# Patient Record
Sex: MALE | Race: WHITE
[De-identification: names, ages, dates, MRNs, and addresses within clinical notes are randomized per-mention and may not be internally consistent; named-entity substitution may affect disease eponyms.]

---

## 2017-01-04 ENCOUNTER — HOSPITAL ENCOUNTER (OUTPATIENT)
Dept: HOSPITAL 62 - OD | Age: 67
End: 2017-01-04
Attending: INTERNAL MEDICINE
Payer: MEDICARE

## 2017-01-04 DIAGNOSIS — R80.9: ICD-10-CM

## 2017-01-04 DIAGNOSIS — E11.22: Primary | ICD-10-CM

## 2017-01-04 DIAGNOSIS — I12.9: ICD-10-CM

## 2017-01-04 DIAGNOSIS — N18.4: ICD-10-CM

## 2017-01-04 DIAGNOSIS — D63.1: ICD-10-CM

## 2017-01-04 LAB
ANION GAP SERPL CALC-SCNC: 13 MMOL/L (ref 5–19)
APPEARANCE UR: (no result)
BILIRUB UR QL STRIP: NEGATIVE
BUN SERPL-MCNC: 52 MG/DL (ref 7–20)
CALCIUM: 8.7 MG/DL (ref 8.4–10.2)
CHLORIDE SERPL-SCNC: 103 MMOL/L (ref 98–107)
CO2 SERPL-SCNC: 24 MMOL/L (ref 22–30)
CREAT SERPL-MCNC: 3.51 MG/DL (ref 0.52–1.25)
ERYTHROCYTE [DISTWIDTH] IN BLOOD BY AUTOMATED COUNT: 14.8 % (ref 11.5–14)
GLUCOSE SERPL-MCNC: 365 MG/DL (ref 75–110)
GLUCOSE UR STRIP-MCNC: 150 MG/DL
HCT VFR BLD CALC: 39 % (ref 37.9–51)
HGB BLD-MCNC: 13 G/DL (ref 13.5–17)
HGB HCT DIFFERENCE: 0
KETONES UR STRIP-MCNC: NEGATIVE MG/DL
MCH RBC QN AUTO: 30 PG (ref 27–33.4)
MCHC RBC AUTO-ENTMCNC: 33.3 G/DL (ref 32–36)
MCV RBC AUTO: 90 FL (ref 80–97)
NITRITE UR QL STRIP: NEGATIVE
PH UR STRIP: 5 [PH] (ref 5–9)
PHOSPHATE SERPL-MCNC: 4.6 MG/DL (ref 2.5–4.5)
POTASSIUM SERPL-SCNC: 4.5 MMOL/L (ref 3.6–5)
PROT UR STRIP-MCNC: >=500 MG/DL
RBC # BLD AUTO: 4.34 10^6/UL (ref 4.35–5.55)
SODIUM SERPL-SCNC: 140 MMOL/L (ref 137–145)
SP GR UR STRIP: 1.02
UROBILINOGEN UR-MCNC: NEGATIVE MG/DL (ref ?–2)
WBC # BLD AUTO: 7.8 10^3/UL (ref 4–10.5)

## 2017-01-04 PROCEDURE — 83970 ASSAY OF PARATHORMONE: CPT

## 2017-01-04 PROCEDURE — 85027 COMPLETE CBC AUTOMATED: CPT

## 2017-01-04 PROCEDURE — 84100 ASSAY OF PHOSPHORUS: CPT

## 2017-01-04 PROCEDURE — 81001 URINALYSIS AUTO W/SCOPE: CPT

## 2017-01-04 PROCEDURE — 36415 COLL VENOUS BLD VENIPUNCTURE: CPT

## 2017-01-04 PROCEDURE — 80048 BASIC METABOLIC PNL TOTAL CA: CPT

## 2017-04-07 ENCOUNTER — HOSPITAL ENCOUNTER (OUTPATIENT)
Dept: HOSPITAL 62 - OD | Age: 67
End: 2017-04-07
Attending: INTERNAL MEDICINE
Payer: MEDICARE

## 2017-04-07 DIAGNOSIS — N18.9: Primary | ICD-10-CM

## 2017-04-07 DIAGNOSIS — R80.9: ICD-10-CM

## 2017-04-07 DIAGNOSIS — E11.9: ICD-10-CM

## 2017-04-07 DIAGNOSIS — E87.5: ICD-10-CM

## 2017-04-07 DIAGNOSIS — D64.9: ICD-10-CM

## 2017-04-07 LAB
ANION GAP SERPL CALC-SCNC: 11 MMOL/L (ref 5–19)
BUN SERPL-MCNC: 59 MG/DL (ref 7–20)
CALCIUM: 9 MG/DL (ref 8.4–10.2)
CHLORIDE SERPL-SCNC: 109 MMOL/L (ref 98–107)
CO2 SERPL-SCNC: 25 MMOL/L (ref 22–30)
CREAT SERPL-MCNC: 3.89 MG/DL (ref 0.52–1.25)
ERYTHROCYTE [DISTWIDTH] IN BLOOD BY AUTOMATED COUNT: 15.4 % (ref 11.5–14)
GLUCOSE SERPL-MCNC: 118 MG/DL (ref 75–110)
HCT VFR BLD CALC: 38.5 % (ref 37.9–51)
HGB BLD-MCNC: 12.9 G/DL (ref 13.5–17)
HGB HCT DIFFERENCE: 0.2
MCH RBC QN AUTO: 29.7 PG (ref 27–33.4)
MCHC RBC AUTO-ENTMCNC: 33.7 G/DL (ref 32–36)
MCV RBC AUTO: 88 FL (ref 80–97)
PHOSPHATE SERPL-MCNC: 4.7 MG/DL (ref 2.5–4.5)
POTASSIUM SERPL-SCNC: 3.9 MMOL/L (ref 3.6–5)
RBC # BLD AUTO: 4.36 10^6/UL (ref 4.35–5.55)
SODIUM SERPL-SCNC: 145.1 MMOL/L (ref 137–145)
WBC # BLD AUTO: 7.4 10^3/UL (ref 4–10.5)

## 2017-04-07 PROCEDURE — 83970 ASSAY OF PARATHORMONE: CPT

## 2017-04-07 PROCEDURE — 85027 COMPLETE CBC AUTOMATED: CPT

## 2017-04-07 PROCEDURE — 36415 COLL VENOUS BLD VENIPUNCTURE: CPT

## 2017-04-07 PROCEDURE — 80048 BASIC METABOLIC PNL TOTAL CA: CPT

## 2017-04-07 PROCEDURE — 84100 ASSAY OF PHOSPHORUS: CPT

## 2017-08-02 ENCOUNTER — HOSPITAL ENCOUNTER (OUTPATIENT)
Dept: HOSPITAL 62 - OD | Age: 67
End: 2017-08-02
Attending: INTERNAL MEDICINE
Payer: MEDICARE

## 2017-08-02 DIAGNOSIS — N18.4: Primary | ICD-10-CM

## 2017-08-02 DIAGNOSIS — E87.5: ICD-10-CM

## 2017-08-02 DIAGNOSIS — E11.9: ICD-10-CM

## 2017-08-02 DIAGNOSIS — D64.9: ICD-10-CM

## 2017-08-02 LAB
ANION GAP SERPL CALC-SCNC: 11 MMOL/L (ref 5–19)
APPEARANCE UR: (no result)
BILIRUB UR QL STRIP: NEGATIVE
BUN SERPL-MCNC: 57 MG/DL (ref 7–20)
CALCIUM: 8.9 MG/DL (ref 8.4–10.2)
CHLORIDE SERPL-SCNC: 109 MMOL/L (ref 98–107)
CO2 SERPL-SCNC: 24 MMOL/L (ref 22–30)
CREAT SERPL-MCNC: 3.77 MG/DL (ref 0.52–1.25)
ERYTHROCYTE [DISTWIDTH] IN BLOOD BY AUTOMATED COUNT: 16.7 % (ref 11.5–14)
GLUCOSE SERPL-MCNC: 138 MG/DL (ref 75–110)
GLUCOSE UR STRIP-MCNC: 50 MG/DL
HCT VFR BLD CALC: 36.3 % (ref 37.9–51)
HGB BLD-MCNC: 12.1 G/DL (ref 13.5–17)
HGB HCT DIFFERENCE: 0
KETONES UR STRIP-MCNC: NEGATIVE MG/DL
MAGNESIUM SERPL-MCNC: 1.9 MG/DL (ref 1.6–2.3)
MCH RBC QN AUTO: 30.5 PG (ref 27–33.4)
MCHC RBC AUTO-ENTMCNC: 33.3 G/DL (ref 32–36)
MCV RBC AUTO: 92 FL (ref 80–97)
NITRITE UR QL STRIP: NEGATIVE
PH UR STRIP: 5 [PH] (ref 5–9)
PHOSPHATE SERPL-MCNC: 5 MG/DL (ref 2.5–4.5)
POTASSIUM SERPL-SCNC: 4.8 MMOL/L (ref 3.6–5)
PROT UR STRIP-MCNC: >=500 MG/DL
RBC # BLD AUTO: 3.96 10^6/UL (ref 4.35–5.55)
SODIUM SERPL-SCNC: 144 MMOL/L (ref 137–145)
SP GR UR STRIP: 1.02
UROBILINOGEN UR-MCNC: NEGATIVE MG/DL (ref ?–2)
WBC # BLD AUTO: 6.2 10^3/UL (ref 4–10.5)

## 2017-08-02 PROCEDURE — 81001 URINALYSIS AUTO W/SCOPE: CPT

## 2017-08-02 PROCEDURE — 85027 COMPLETE CBC AUTOMATED: CPT

## 2017-08-02 PROCEDURE — 83970 ASSAY OF PARATHORMONE: CPT

## 2017-08-02 PROCEDURE — 83735 ASSAY OF MAGNESIUM: CPT

## 2017-08-02 PROCEDURE — 84100 ASSAY OF PHOSPHORUS: CPT

## 2017-08-02 PROCEDURE — 36415 COLL VENOUS BLD VENIPUNCTURE: CPT

## 2017-08-02 PROCEDURE — 80048 BASIC METABOLIC PNL TOTAL CA: CPT

## 2017-09-05 ENCOUNTER — HOSPITAL ENCOUNTER (OUTPATIENT)
Dept: HOSPITAL 62 - OD | Age: 67
End: 2017-09-05
Attending: INTERNAL MEDICINE
Payer: MEDICARE

## 2017-09-05 DIAGNOSIS — E87.5: ICD-10-CM

## 2017-09-05 DIAGNOSIS — I12.9: ICD-10-CM

## 2017-09-05 DIAGNOSIS — N18.4: ICD-10-CM

## 2017-09-05 DIAGNOSIS — I50.9: ICD-10-CM

## 2017-09-05 DIAGNOSIS — E11.22: Primary | ICD-10-CM

## 2017-09-05 LAB
ANION GAP SERPL CALC-SCNC: 11 MMOL/L (ref 5–19)
BUN SERPL-MCNC: 54 MG/DL (ref 7–20)
CALCIUM: 9.4 MG/DL (ref 8.4–10.2)
CHLORIDE SERPL-SCNC: 107 MMOL/L (ref 98–107)
CO2 SERPL-SCNC: 27 MMOL/L (ref 22–30)
CREAT SERPL-MCNC: 4.21 MG/DL (ref 0.52–1.25)
ERYTHROCYTE [DISTWIDTH] IN BLOOD BY AUTOMATED COUNT: 16.5 % (ref 11.5–14)
GLUCOSE SERPL-MCNC: 73 MG/DL (ref 75–110)
HCT VFR BLD CALC: 31.5 % (ref 37.9–51)
HGB BLD-MCNC: 10.6 G/DL (ref 13.5–17)
HGB HCT DIFFERENCE: 0.3
MCH RBC QN AUTO: 30.8 PG (ref 27–33.4)
MCHC RBC AUTO-ENTMCNC: 33.6 G/DL (ref 32–36)
MCV RBC AUTO: 92 FL (ref 80–97)
POTASSIUM SERPL-SCNC: 4.3 MMOL/L (ref 3.6–5)
RBC # BLD AUTO: 3.44 10^6/UL (ref 4.35–5.55)
SODIUM SERPL-SCNC: 144.6 MMOL/L (ref 137–145)
WBC # BLD AUTO: 7.7 10^3/UL (ref 4–10.5)

## 2017-09-05 PROCEDURE — 85027 COMPLETE CBC AUTOMATED: CPT

## 2017-09-05 PROCEDURE — 80048 BASIC METABOLIC PNL TOTAL CA: CPT

## 2017-09-05 PROCEDURE — 36415 COLL VENOUS BLD VENIPUNCTURE: CPT

## 2017-10-31 ENCOUNTER — HOSPITAL ENCOUNTER (OUTPATIENT)
Dept: HOSPITAL 62 - OD | Age: 67
End: 2017-10-31
Attending: INTERNAL MEDICINE
Payer: MEDICARE

## 2017-10-31 DIAGNOSIS — N18.4: Primary | ICD-10-CM

## 2017-10-31 DIAGNOSIS — I50.9: ICD-10-CM

## 2017-10-31 DIAGNOSIS — R80.9: ICD-10-CM

## 2017-10-31 DIAGNOSIS — D64.9: ICD-10-CM

## 2017-10-31 LAB
ANION GAP SERPL CALC-SCNC: 11 MMOL/L (ref 5–19)
BUN SERPL-MCNC: 56 MG/DL (ref 7–20)
CALCIUM: 8.8 MG/DL (ref 8.4–10.2)
CHLORIDE SERPL-SCNC: 109 MMOL/L (ref 98–107)
CO2 SERPL-SCNC: 25 MMOL/L (ref 22–30)
CREAT SERPL-MCNC: 3.96 MG/DL (ref 0.52–1.25)
ERYTHROCYTE [DISTWIDTH] IN BLOOD BY AUTOMATED COUNT: 15.7 % (ref 11.5–14)
GLUCOSE SERPL-MCNC: 158 MG/DL (ref 75–110)
HCT VFR BLD CALC: 34.4 % (ref 37.9–51)
HGB BLD-MCNC: 11.5 G/DL (ref 13.5–17)
HGB HCT DIFFERENCE: 0.1
MAGNESIUM SERPL-MCNC: 1.6 MG/DL (ref 1.6–2.3)
MCH RBC QN AUTO: 30.9 PG (ref 27–33.4)
MCHC RBC AUTO-ENTMCNC: 33.4 G/DL (ref 32–36)
MCV RBC AUTO: 93 FL (ref 80–97)
POTASSIUM SERPL-SCNC: 4.1 MMOL/L (ref 3.6–5)
RBC # BLD AUTO: 3.72 10^6/UL (ref 4.35–5.55)
SODIUM SERPL-SCNC: 144.5 MMOL/L (ref 137–145)
WBC # BLD AUTO: 7.8 10^3/UL (ref 4–10.5)

## 2017-10-31 PROCEDURE — 83735 ASSAY OF MAGNESIUM: CPT

## 2017-10-31 PROCEDURE — 36415 COLL VENOUS BLD VENIPUNCTURE: CPT

## 2017-10-31 PROCEDURE — 85027 COMPLETE CBC AUTOMATED: CPT

## 2017-10-31 PROCEDURE — 80048 BASIC METABOLIC PNL TOTAL CA: CPT

## 2017-12-27 ENCOUNTER — HOSPITAL ENCOUNTER (OUTPATIENT)
Dept: HOSPITAL 62 - OD | Age: 67
End: 2017-12-27
Attending: INTERNAL MEDICINE
Payer: MEDICARE

## 2017-12-27 DIAGNOSIS — N18.4: Primary | ICD-10-CM

## 2017-12-27 DIAGNOSIS — R80.9: ICD-10-CM

## 2017-12-27 DIAGNOSIS — D64.9: ICD-10-CM

## 2017-12-27 DIAGNOSIS — I50.9: ICD-10-CM

## 2017-12-27 LAB
ANION GAP SERPL CALC-SCNC: 10 MMOL/L (ref 5–19)
BUN SERPL-MCNC: 55 MG/DL (ref 7–20)
CALCIUM: 8.6 MG/DL (ref 8.4–10.2)
CHLORIDE SERPL-SCNC: 109 MMOL/L (ref 98–107)
CO2 SERPL-SCNC: 26 MMOL/L (ref 22–30)
ERYTHROCYTE [DISTWIDTH] IN BLOOD BY AUTOMATED COUNT: 16 % (ref 11.5–14)
GLUCOSE SERPL-MCNC: 184 MG/DL (ref 75–110)
HCT VFR BLD CALC: 34.5 % (ref 37.9–51)
HGB BLD-MCNC: 11.1 G/DL (ref 13.5–17)
MCH RBC QN AUTO: 29.7 PG (ref 27–33.4)
MCHC RBC AUTO-ENTMCNC: 32.1 G/DL (ref 32–36)
MCV RBC AUTO: 93 FL (ref 80–97)
PLATELET # BLD: 141 10^3/UL (ref 150–450)
POTASSIUM SERPL-SCNC: 4 MMOL/L (ref 3.6–5)
RBC # BLD AUTO: 3.73 10^6/UL (ref 4.35–5.55)
SODIUM SERPL-SCNC: 145.2 MMOL/L (ref 137–145)
WBC # BLD AUTO: 8.1 10^3/UL (ref 4–10.5)

## 2017-12-27 PROCEDURE — 36415 COLL VENOUS BLD VENIPUNCTURE: CPT

## 2017-12-27 PROCEDURE — 80048 BASIC METABOLIC PNL TOTAL CA: CPT

## 2017-12-27 PROCEDURE — 85027 COMPLETE CBC AUTOMATED: CPT

## 2018-01-19 ENCOUNTER — HOSPITAL ENCOUNTER (OUTPATIENT)
Dept: HOSPITAL 62 - OD | Age: 68
End: 2018-01-19
Attending: INTERNAL MEDICINE
Payer: MEDICARE

## 2018-01-19 DIAGNOSIS — N18.4: ICD-10-CM

## 2018-01-19 DIAGNOSIS — E11.9: ICD-10-CM

## 2018-01-19 DIAGNOSIS — I12.9: Primary | ICD-10-CM

## 2018-01-19 DIAGNOSIS — R80.9: ICD-10-CM

## 2018-01-19 DIAGNOSIS — I50.9: ICD-10-CM

## 2018-01-19 LAB
ANION GAP SERPL CALC-SCNC: 13 MMOL/L (ref 5–19)
BUN SERPL-MCNC: 90 MG/DL (ref 7–20)
CALCIUM: 8.9 MG/DL (ref 8.4–10.2)
CHLORIDE SERPL-SCNC: 103 MMOL/L (ref 98–107)
CO2 SERPL-SCNC: 24 MMOL/L (ref 22–30)
ERYTHROCYTE [DISTWIDTH] IN BLOOD BY AUTOMATED COUNT: 15.8 % (ref 11.5–14)
GLUCOSE SERPL-MCNC: 324 MG/DL (ref 75–110)
HCT VFR BLD CALC: 37 % (ref 37.9–51)
HGB BLD-MCNC: 12.2 G/DL (ref 13.5–17)
MAGNESIUM SERPL-MCNC: 1.7 MG/DL (ref 1.6–2.3)
MCH RBC QN AUTO: 29.7 PG (ref 27–33.4)
MCHC RBC AUTO-ENTMCNC: 33.1 G/DL (ref 32–36)
MCV RBC AUTO: 90 FL (ref 80–97)
PHOSPHATE SERPL-MCNC: 7.2 MG/DL (ref 2.5–4.5)
PLATELET # BLD: 130 10^3/UL (ref 150–450)
POTASSIUM SERPL-SCNC: 4.4 MMOL/L (ref 3.6–5)
RBC # BLD AUTO: 4.12 10^6/UL (ref 4.35–5.55)
SODIUM SERPL-SCNC: 140.2 MMOL/L (ref 137–145)
WBC # BLD AUTO: 8.8 10^3/UL (ref 4–10.5)

## 2018-01-19 PROCEDURE — 84100 ASSAY OF PHOSPHORUS: CPT

## 2018-01-19 PROCEDURE — 83735 ASSAY OF MAGNESIUM: CPT

## 2018-01-19 PROCEDURE — 36415 COLL VENOUS BLD VENIPUNCTURE: CPT

## 2018-01-19 PROCEDURE — 80048 BASIC METABOLIC PNL TOTAL CA: CPT

## 2018-01-19 PROCEDURE — 85027 COMPLETE CBC AUTOMATED: CPT

## 2018-01-19 PROCEDURE — 83970 ASSAY OF PARATHORMONE: CPT

## 2018-02-13 ENCOUNTER — HOSPITAL ENCOUNTER (OUTPATIENT)
Dept: HOSPITAL 62 - OD | Age: 68
End: 2018-02-13
Attending: INTERNAL MEDICINE
Payer: MEDICARE

## 2018-02-13 DIAGNOSIS — N18.4: Primary | ICD-10-CM

## 2018-02-13 DIAGNOSIS — D64.9: ICD-10-CM

## 2018-02-13 DIAGNOSIS — E11.9: ICD-10-CM

## 2018-02-13 DIAGNOSIS — I50.9: ICD-10-CM

## 2018-02-13 LAB
ANION GAP SERPL CALC-SCNC: 10 MMOL/L (ref 5–19)
BUN SERPL-MCNC: 75 MG/DL (ref 7–20)
CALCIUM: 9.4 MG/DL (ref 8.4–10.2)
CHLORIDE SERPL-SCNC: 104 MMOL/L (ref 98–107)
CO2 SERPL-SCNC: 28 MMOL/L (ref 22–30)
ERYTHROCYTE [DISTWIDTH] IN BLOOD BY AUTOMATED COUNT: 16.6 % (ref 11.5–14)
GLUCOSE SERPL-MCNC: 74 MG/DL (ref 75–110)
HCT VFR BLD CALC: 35.2 % (ref 37.9–51)
HGB BLD-MCNC: 11.6 G/DL (ref 13.5–17)
MCH RBC QN AUTO: 29.8 PG (ref 27–33.4)
MCHC RBC AUTO-ENTMCNC: 33 G/DL (ref 32–36)
MCV RBC AUTO: 90 FL (ref 80–97)
PHOSPHATE SERPL-MCNC: 7.3 MG/DL (ref 2.5–4.5)
PLATELET # BLD: 132 10^3/UL (ref 150–450)
POTASSIUM SERPL-SCNC: 4.4 MMOL/L (ref 3.6–5)
RBC # BLD AUTO: 3.9 10^6/UL (ref 4.35–5.55)
SODIUM SERPL-SCNC: 142.4 MMOL/L (ref 137–145)
WBC # BLD AUTO: 8.5 10^3/UL (ref 4–10.5)

## 2018-02-13 PROCEDURE — 80048 BASIC METABOLIC PNL TOTAL CA: CPT

## 2018-02-13 PROCEDURE — 83970 ASSAY OF PARATHORMONE: CPT

## 2018-02-13 PROCEDURE — 36415 COLL VENOUS BLD VENIPUNCTURE: CPT

## 2018-02-13 PROCEDURE — 85027 COMPLETE CBC AUTOMATED: CPT

## 2018-02-13 PROCEDURE — 84100 ASSAY OF PHOSPHORUS: CPT

## 2018-03-05 ENCOUNTER — HOSPITAL ENCOUNTER (OUTPATIENT)
Dept: HOSPITAL 62 - OD | Age: 68
End: 2018-03-05
Attending: INTERNAL MEDICINE
Payer: MEDICARE

## 2018-03-05 DIAGNOSIS — N18.4: Primary | ICD-10-CM

## 2018-03-05 DIAGNOSIS — D64.9: ICD-10-CM

## 2018-03-05 DIAGNOSIS — I50.9: ICD-10-CM

## 2018-03-05 DIAGNOSIS — E87.5: ICD-10-CM

## 2018-03-05 LAB
ANION GAP SERPL CALC-SCNC: 14 MMOL/L (ref 5–19)
APPEARANCE UR: CLEAR
APTT PPP: YELLOW S
BILIRUB UR QL STRIP: NEGATIVE
BUN SERPL-MCNC: 73 MG/DL (ref 7–20)
CALCIUM: 9.5 MG/DL (ref 8.4–10.2)
CHLORIDE SERPL-SCNC: 104 MMOL/L (ref 98–107)
CO2 SERPL-SCNC: 25 MMOL/L (ref 22–30)
ERYTHROCYTE [DISTWIDTH] IN BLOOD BY AUTOMATED COUNT: 17.2 % (ref 11.5–14)
GLUCOSE SERPL-MCNC: 186 MG/DL (ref 75–110)
GLUCOSE UR STRIP-MCNC: 50 MG/DL
HCT VFR BLD CALC: 35.4 % (ref 37.9–51)
HGB BLD-MCNC: 11.6 G/DL (ref 13.5–17)
KETONES UR STRIP-MCNC: NEGATIVE MG/DL
MCH RBC QN AUTO: 29.5 PG (ref 27–33.4)
MCHC RBC AUTO-ENTMCNC: 32.8 G/DL (ref 32–36)
MCV RBC AUTO: 90 FL (ref 80–97)
NITRITE UR QL STRIP: NEGATIVE
PH UR STRIP: 5 [PH] (ref 5–9)
PHOSPHATE SERPL-MCNC: 6.2 MG/DL (ref 2.5–4.5)
PLATELET # BLD: 135 10^3/UL (ref 150–450)
POTASSIUM SERPL-SCNC: 5.1 MMOL/L (ref 3.6–5)
PROT UR STRIP-MCNC: >=500 MG/DL
RBC # BLD AUTO: 3.92 10^6/UL (ref 4.35–5.55)
SODIUM SERPL-SCNC: 142.6 MMOL/L (ref 137–145)
SP GR UR STRIP: 1.01
UROBILINOGEN UR-MCNC: NEGATIVE MG/DL (ref ?–2)
WBC # BLD AUTO: 9.1 10^3/UL (ref 4–10.5)

## 2018-03-05 PROCEDURE — 85027 COMPLETE CBC AUTOMATED: CPT

## 2018-03-05 PROCEDURE — 84100 ASSAY OF PHOSPHORUS: CPT

## 2018-03-05 PROCEDURE — 81001 URINALYSIS AUTO W/SCOPE: CPT

## 2018-03-05 PROCEDURE — 36415 COLL VENOUS BLD VENIPUNCTURE: CPT

## 2018-03-05 PROCEDURE — 80048 BASIC METABOLIC PNL TOTAL CA: CPT

## 2018-05-07 ENCOUNTER — HOSPITAL ENCOUNTER (OUTPATIENT)
Dept: HOSPITAL 62 - OD | Age: 68
End: 2018-05-07
Attending: INTERNAL MEDICINE
Payer: MEDICARE

## 2018-05-07 DIAGNOSIS — D64.9: ICD-10-CM

## 2018-05-07 DIAGNOSIS — N18.4: ICD-10-CM

## 2018-05-07 DIAGNOSIS — I50.9: ICD-10-CM

## 2018-05-07 DIAGNOSIS — E11.22: Primary | ICD-10-CM

## 2018-05-07 LAB
ANION GAP SERPL CALC-SCNC: 11 MMOL/L (ref 5–19)
APPEARANCE UR: CLEAR
APTT PPP: YELLOW S
BILIRUB UR QL STRIP: NEGATIVE
BUN SERPL-MCNC: 80 MG/DL (ref 7–20)
CALCIUM: 9.6 MG/DL (ref 8.4–10.2)
CHLORIDE SERPL-SCNC: 103 MMOL/L (ref 98–107)
CO2 SERPL-SCNC: 30 MMOL/L (ref 22–30)
CREAT UR-MCNC: 45.2 MG/DL (ref 22–328)
ERYTHROCYTE [DISTWIDTH] IN BLOOD BY AUTOMATED COUNT: 16.9 % (ref 11.5–14)
GLUCOSE SERPL-MCNC: 146 MG/DL (ref 75–110)
GLUCOSE UR STRIP-MCNC: 50 MG/DL
HCT VFR BLD CALC: 35.9 % (ref 37.9–51)
HGB BLD-MCNC: 12 G/DL (ref 13.5–17)
KETONES UR STRIP-MCNC: NEGATIVE MG/DL
MCH RBC QN AUTO: 30.6 PG (ref 27–33.4)
MCHC RBC AUTO-ENTMCNC: 33.4 G/DL (ref 32–36)
MCV RBC AUTO: 92 FL (ref 80–97)
NITRITE UR QL STRIP: NEGATIVE
PH UR STRIP: 5 [PH] (ref 5–9)
PHOSPHATE SERPL-MCNC: 7.3 MG/DL (ref 2.5–4.5)
PLATELET # BLD: 106 10^3/UL (ref 150–450)
POTASSIUM SERPL-SCNC: 4.3 MMOL/L (ref 3.6–5)
PROT UR STRIP-MCNC: 296.7 MG/DL (ref ?–12)
PROT UR STRIP-MCNC: >=500 MG/DL
RBC # BLD AUTO: 3.92 10^6/UL (ref 4.35–5.55)
SODIUM SERPL-SCNC: 143.6 MMOL/L (ref 137–145)
SP GR UR STRIP: 1.01
UR PRO/CREAT RATIO RESULT: 6.6 MG/MG (ref 0–0.2)
UROBILINOGEN UR-MCNC: NEGATIVE MG/DL (ref ?–2)
WBC # BLD AUTO: 7 10^3/UL (ref 4–10.5)

## 2018-05-07 PROCEDURE — 83970 ASSAY OF PARATHORMONE: CPT

## 2018-05-07 PROCEDURE — 84100 ASSAY OF PHOSPHORUS: CPT

## 2018-05-07 PROCEDURE — 82570 ASSAY OF URINE CREATININE: CPT

## 2018-05-07 PROCEDURE — 84156 ASSAY OF PROTEIN URINE: CPT

## 2018-05-07 PROCEDURE — 80048 BASIC METABOLIC PNL TOTAL CA: CPT

## 2018-05-07 PROCEDURE — 85027 COMPLETE CBC AUTOMATED: CPT

## 2018-05-07 PROCEDURE — 81001 URINALYSIS AUTO W/SCOPE: CPT

## 2018-05-07 PROCEDURE — 36415 COLL VENOUS BLD VENIPUNCTURE: CPT

## 2018-05-30 ENCOUNTER — HOSPITAL ENCOUNTER (OUTPATIENT)
Dept: HOSPITAL 62 - OD | Age: 68
End: 2018-05-30
Attending: INTERNAL MEDICINE
Payer: MEDICARE

## 2018-05-30 DIAGNOSIS — N18.4: ICD-10-CM

## 2018-05-30 DIAGNOSIS — E87.5: ICD-10-CM

## 2018-05-30 DIAGNOSIS — E11.22: Primary | ICD-10-CM

## 2018-05-30 DIAGNOSIS — I50.9: ICD-10-CM

## 2018-05-30 LAB
ANION GAP SERPL CALC-SCNC: 16 MMOL/L (ref 5–19)
APPEARANCE UR: (no result)
APTT PPP: YELLOW S
BILIRUB UR QL STRIP: NEGATIVE
BUN SERPL-MCNC: 85 MG/DL (ref 7–20)
CALCIUM: 10.2 MG/DL (ref 8.4–10.2)
CHLORIDE SERPL-SCNC: 104 MMOL/L (ref 98–107)
CO2 SERPL-SCNC: 27 MMOL/L (ref 22–30)
CREAT UR-MCNC: 113.9 MG/DL (ref 22–328)
ERYTHROCYTE [DISTWIDTH] IN BLOOD BY AUTOMATED COUNT: 16.1 % (ref 11.5–14)
GLUCOSE SERPL-MCNC: 84 MG/DL (ref 75–110)
GLUCOSE UR STRIP-MCNC: 50 MG/DL
HCT VFR BLD CALC: 36.6 % (ref 37.9–51)
HGB BLD-MCNC: 12.1 G/DL (ref 13.5–17)
KETONES UR STRIP-MCNC: NEGATIVE MG/DL
MCH RBC QN AUTO: 30.4 PG (ref 27–33.4)
MCHC RBC AUTO-ENTMCNC: 33.1 G/DL (ref 32–36)
MCV RBC AUTO: 92 FL (ref 80–97)
NITRITE UR QL STRIP: NEGATIVE
PH UR STRIP: 5 [PH] (ref 5–9)
PHOSPHATE SERPL-MCNC: 7.9 MG/DL (ref 2.5–4.5)
PLATELET # BLD: 131 10^3/UL (ref 150–450)
POTASSIUM SERPL-SCNC: 4.7 MMOL/L (ref 3.6–5)
PROT UR STRIP-MCNC: 506 MG/DL (ref ?–12)
PROT UR STRIP-MCNC: >=500 MG/DL
RBC # BLD AUTO: 3.99 10^6/UL (ref 4.35–5.55)
SODIUM SERPL-SCNC: 146.5 MMOL/L (ref 137–145)
SP GR UR STRIP: 1.01
UR PRO/CREAT RATIO RESULT: 4.4 MG/MG (ref 0–0.2)
UROBILINOGEN UR-MCNC: NEGATIVE MG/DL (ref ?–2)
WBC # BLD AUTO: 9.6 10^3/UL (ref 4–10.5)

## 2018-05-30 PROCEDURE — 84156 ASSAY OF PROTEIN URINE: CPT

## 2018-05-30 PROCEDURE — 84100 ASSAY OF PHOSPHORUS: CPT

## 2018-05-30 PROCEDURE — 80048 BASIC METABOLIC PNL TOTAL CA: CPT

## 2018-05-30 PROCEDURE — 82570 ASSAY OF URINE CREATININE: CPT

## 2018-05-30 PROCEDURE — 83970 ASSAY OF PARATHORMONE: CPT

## 2018-05-30 PROCEDURE — 36415 COLL VENOUS BLD VENIPUNCTURE: CPT

## 2018-05-30 PROCEDURE — 85027 COMPLETE CBC AUTOMATED: CPT

## 2018-05-30 PROCEDURE — 81001 URINALYSIS AUTO W/SCOPE: CPT

## 2018-06-12 ENCOUNTER — HOSPITAL ENCOUNTER (OUTPATIENT)
Dept: HOSPITAL 62 - OD | Age: 68
End: 2018-06-12
Attending: INTERNAL MEDICINE
Payer: MEDICARE

## 2018-06-12 DIAGNOSIS — N18.4: ICD-10-CM

## 2018-06-12 DIAGNOSIS — D64.9: ICD-10-CM

## 2018-06-12 DIAGNOSIS — I12.9: Primary | ICD-10-CM

## 2018-06-12 DIAGNOSIS — E11.9: ICD-10-CM

## 2018-06-12 LAB
ANION GAP SERPL CALC-SCNC: 15 MMOL/L (ref 5–19)
BUN SERPL-MCNC: 90 MG/DL (ref 7–20)
CALCIUM: 9.8 MG/DL (ref 8.4–10.2)
CHLORIDE SERPL-SCNC: 104 MMOL/L (ref 98–107)
CO2 SERPL-SCNC: 27 MMOL/L (ref 22–30)
ERYTHROCYTE [DISTWIDTH] IN BLOOD BY AUTOMATED COUNT: 16.1 % (ref 11.5–14)
GLUCOSE SERPL-MCNC: 128 MG/DL (ref 75–110)
HCT VFR BLD CALC: 38.7 % (ref 37.9–51)
HGB BLD-MCNC: 12.8 G/DL (ref 13.5–17)
MCH RBC QN AUTO: 30.4 PG (ref 27–33.4)
MCHC RBC AUTO-ENTMCNC: 33.2 G/DL (ref 32–36)
MCV RBC AUTO: 92 FL (ref 80–97)
PLATELET # BLD: 125 10^3/UL (ref 150–450)
POTASSIUM SERPL-SCNC: 3.8 MMOL/L (ref 3.6–5)
RBC # BLD AUTO: 4.23 10^6/UL (ref 4.35–5.55)
SODIUM SERPL-SCNC: 146.4 MMOL/L (ref 137–145)
WBC # BLD AUTO: 7.8 10^3/UL (ref 4–10.5)

## 2018-06-12 PROCEDURE — 83735 ASSAY OF MAGNESIUM: CPT

## 2018-06-12 PROCEDURE — 85027 COMPLETE CBC AUTOMATED: CPT

## 2018-06-12 PROCEDURE — 80048 BASIC METABOLIC PNL TOTAL CA: CPT

## 2018-06-12 PROCEDURE — 36415 COLL VENOUS BLD VENIPUNCTURE: CPT

## 2018-06-18 ENCOUNTER — HOSPITAL ENCOUNTER (OUTPATIENT)
Dept: HOSPITAL 62 - OD | Age: 68
End: 2018-06-18
Attending: INTERNAL MEDICINE
Payer: MEDICARE

## 2018-06-18 DIAGNOSIS — N18.4: ICD-10-CM

## 2018-06-18 DIAGNOSIS — E11.22: Primary | ICD-10-CM

## 2018-06-18 DIAGNOSIS — I12.9: ICD-10-CM

## 2018-06-18 LAB
ANION GAP SERPL CALC-SCNC: 16 MMOL/L (ref 5–19)
BUN SERPL-MCNC: 84 MG/DL (ref 7–20)
CALCIUM: 9.1 MG/DL (ref 8.4–10.2)
CHLORIDE SERPL-SCNC: 105 MMOL/L (ref 98–107)
CO2 SERPL-SCNC: 25 MMOL/L (ref 22–30)
CREAT CL ?TM UR+SERPL-VRATE: 17 ML/MIN (ref 87–140)
CREAT SERPL-MCNC: 5.54 MG/DL (ref 0.52–1.25)
CREAT UR-MCNC: 68.9 MG/DL (ref 22–328)
ERYTHROCYTE [DISTWIDTH] IN BLOOD BY AUTOMATED COUNT: 16.3 % (ref 11.5–14)
GLUCOSE SERPL-MCNC: 177 MG/DL (ref 75–110)
HCT VFR BLD CALC: 37.9 % (ref 37.9–51)
HGB BLD-MCNC: 12.6 G/DL (ref 13.5–17)
MCH RBC QN AUTO: 30.7 PG (ref 27–33.4)
MCHC RBC AUTO-ENTMCNC: 33.4 G/DL (ref 32–36)
MCV RBC AUTO: 92 FL (ref 80–97)
PLATELET # BLD: 121 10^3/UL (ref 150–450)
POTASSIUM SERPL-SCNC: 3.9 MMOL/L (ref 3.6–5)
RBC # BLD AUTO: 4.12 10^6/UL (ref 4.35–5.55)
SODIUM SERPL-SCNC: 145.7 MMOL/L (ref 137–145)
WBC # BLD AUTO: 8.2 10^3/UL (ref 4–10.5)

## 2018-06-18 PROCEDURE — 36415 COLL VENOUS BLD VENIPUNCTURE: CPT

## 2018-06-18 PROCEDURE — 82575 CREATININE CLEARANCE TEST: CPT

## 2018-06-18 PROCEDURE — 85027 COMPLETE CBC AUTOMATED: CPT

## 2018-06-18 PROCEDURE — 80048 BASIC METABOLIC PNL TOTAL CA: CPT

## 2018-07-30 ENCOUNTER — HOSPITAL ENCOUNTER (OUTPATIENT)
Dept: HOSPITAL 62 - OD | Age: 68
End: 2018-07-30
Attending: INTERNAL MEDICINE
Payer: MEDICARE

## 2018-07-30 DIAGNOSIS — R80.9: ICD-10-CM

## 2018-07-30 DIAGNOSIS — E11.9: ICD-10-CM

## 2018-07-30 DIAGNOSIS — N18.4: Primary | ICD-10-CM

## 2018-07-30 DIAGNOSIS — I50.9: ICD-10-CM

## 2018-07-30 DIAGNOSIS — E87.5: ICD-10-CM

## 2018-07-30 LAB
ANION GAP SERPL CALC-SCNC: 15 MMOL/L (ref 5–19)
APPEARANCE UR: CLEAR
APTT PPP: YELLOW S
BILIRUB UR QL STRIP: NEGATIVE
BUN SERPL-MCNC: 86 MG/DL (ref 7–20)
CALCIUM: 10 MG/DL (ref 8.4–10.2)
CHLORIDE SERPL-SCNC: 98 MMOL/L (ref 98–107)
CO2 SERPL-SCNC: 27 MMOL/L (ref 22–30)
CREAT UR-MCNC: 93.9 MG/DL (ref 22–328)
ERYTHROCYTE [DISTWIDTH] IN BLOOD BY AUTOMATED COUNT: 16.2 % (ref 11.5–14)
GLUCOSE SERPL-MCNC: 350 MG/DL (ref 75–110)
GLUCOSE UR STRIP-MCNC: >=500 MG/DL
HCT VFR BLD CALC: 38.5 % (ref 37.9–51)
HGB BLD-MCNC: 13.1 G/DL (ref 13.5–17)
KETONES UR STRIP-MCNC: NEGATIVE MG/DL
MCH RBC QN AUTO: 30.9 PG (ref 27–33.4)
MCHC RBC AUTO-ENTMCNC: 34.2 G/DL (ref 32–36)
MCV RBC AUTO: 90 FL (ref 80–97)
NITRITE UR QL STRIP: NEGATIVE
PH UR STRIP: 5 [PH] (ref 5–9)
PHOSPHATE SERPL-MCNC: 6.2 MG/DL (ref 2.5–4.5)
PLATELET # BLD: 148 10^3/UL (ref 150–450)
POTASSIUM SERPL-SCNC: 4.4 MMOL/L (ref 3.6–5)
PROT UR STRIP-MCNC: 510.5 MG/DL (ref ?–12)
PROT UR STRIP-MCNC: >=500 MG/DL
RBC # BLD AUTO: 4.26 10^6/UL (ref 4.35–5.55)
SODIUM SERPL-SCNC: 140 MMOL/L (ref 137–145)
SP GR UR STRIP: 1.01
UR PRO/CREAT RATIO RESULT: 5.4 MG/MG (ref 0–0.2)
UROBILINOGEN UR-MCNC: NEGATIVE MG/DL (ref ?–2)
WBC # BLD AUTO: 9.8 10^3/UL (ref 4–10.5)

## 2018-07-30 PROCEDURE — 82570 ASSAY OF URINE CREATININE: CPT

## 2018-07-30 PROCEDURE — 36415 COLL VENOUS BLD VENIPUNCTURE: CPT

## 2018-07-30 PROCEDURE — 80048 BASIC METABOLIC PNL TOTAL CA: CPT

## 2018-07-30 PROCEDURE — 81001 URINALYSIS AUTO W/SCOPE: CPT

## 2018-07-30 PROCEDURE — 84100 ASSAY OF PHOSPHORUS: CPT

## 2018-07-30 PROCEDURE — 83970 ASSAY OF PARATHORMONE: CPT

## 2018-07-30 PROCEDURE — 84156 ASSAY OF PROTEIN URINE: CPT

## 2018-07-30 PROCEDURE — 85027 COMPLETE CBC AUTOMATED: CPT

## 2018-09-10 ENCOUNTER — HOSPITAL ENCOUNTER (INPATIENT)
Dept: HOSPITAL 62 - ER | Age: 68
LOS: 2 days | Discharge: ANOTHER HOSPITAL | DRG: 291 | End: 2018-09-12
Attending: INTERNAL MEDICINE | Admitting: INTERNAL MEDICINE
Payer: MEDICARE

## 2018-09-10 DIAGNOSIS — Z79.4: ICD-10-CM

## 2018-09-10 DIAGNOSIS — N17.9: ICD-10-CM

## 2018-09-10 DIAGNOSIS — I13.2: Primary | ICD-10-CM

## 2018-09-10 DIAGNOSIS — M19.90: ICD-10-CM

## 2018-09-10 DIAGNOSIS — Z77.090: ICD-10-CM

## 2018-09-10 DIAGNOSIS — I50.43: ICD-10-CM

## 2018-09-10 DIAGNOSIS — Z79.82: ICD-10-CM

## 2018-09-10 DIAGNOSIS — Z95.0: ICD-10-CM

## 2018-09-10 DIAGNOSIS — Z84.89: ICD-10-CM

## 2018-09-10 DIAGNOSIS — N18.6: ICD-10-CM

## 2018-09-10 DIAGNOSIS — J61: ICD-10-CM

## 2018-09-10 DIAGNOSIS — E66.9: ICD-10-CM

## 2018-09-10 DIAGNOSIS — I25.10: ICD-10-CM

## 2018-09-10 DIAGNOSIS — Z87.891: ICD-10-CM

## 2018-09-10 DIAGNOSIS — F32.9: ICD-10-CM

## 2018-09-10 DIAGNOSIS — Z88.8: ICD-10-CM

## 2018-09-10 DIAGNOSIS — Z95.5: ICD-10-CM

## 2018-09-10 DIAGNOSIS — Z79.899: ICD-10-CM

## 2018-09-10 DIAGNOSIS — E11.22: ICD-10-CM

## 2018-09-10 DIAGNOSIS — N25.81: ICD-10-CM

## 2018-09-10 DIAGNOSIS — Z82.49: ICD-10-CM

## 2018-09-10 DIAGNOSIS — D63.1: ICD-10-CM

## 2018-09-10 DIAGNOSIS — Z98.84: ICD-10-CM

## 2018-09-10 DIAGNOSIS — E11.65: ICD-10-CM

## 2018-09-10 LAB
ADD MANUAL DIFF: NO
ANION GAP SERPL CALC-SCNC: 14 MMOL/L (ref 5–19)
BASOPHILS # BLD AUTO: 0.1 10^3/UL (ref 0–0.2)
BASOPHILS NFR BLD AUTO: 1.2 % (ref 0–2)
BUN SERPL-MCNC: 67 MG/DL (ref 7–20)
CALCIUM: 9.5 MG/DL (ref 8.4–10.2)
CHLORIDE SERPL-SCNC: 104 MMOL/L (ref 98–107)
CO2 SERPL-SCNC: 23 MMOL/L (ref 22–30)
EOSINOPHIL # BLD AUTO: 0.2 10^3/UL (ref 0–0.6)
EOSINOPHIL NFR BLD AUTO: 2.2 % (ref 0–6)
ERYTHROCYTE [DISTWIDTH] IN BLOOD BY AUTOMATED COUNT: 16.3 % (ref 11.5–14)
GLUCOSE SERPL-MCNC: 148 MG/DL (ref 75–110)
HCT VFR BLD CALC: 40.6 % (ref 37.9–51)
HGB BLD-MCNC: 13.3 G/DL (ref 13.5–17)
LYMPHOCYTES # BLD AUTO: 1.4 10^3/UL (ref 0.5–4.7)
LYMPHOCYTES NFR BLD AUTO: 14 % (ref 13–45)
MCH RBC QN AUTO: 30.1 PG (ref 27–33.4)
MCHC RBC AUTO-ENTMCNC: 32.8 G/DL (ref 32–36)
MCV RBC AUTO: 92 FL (ref 80–97)
MONOCYTES # BLD AUTO: 0.9 10^3/UL (ref 0.1–1.4)
MONOCYTES NFR BLD AUTO: 8.9 % (ref 3–13)
NEUTROPHILS # BLD AUTO: 7.6 10^3/UL (ref 1.7–8.2)
NEUTS SEG NFR BLD AUTO: 73.7 % (ref 42–78)
PLATELET # BLD: 223 10^3/UL (ref 150–450)
POTASSIUM SERPL-SCNC: 3.6 MMOL/L (ref 3.6–5)
RBC # BLD AUTO: 4.41 10^6/UL (ref 4.35–5.55)
SODIUM SERPL-SCNC: 141.4 MMOL/L (ref 137–145)
TOTAL CELLS COUNTED % (AUTO): 100 %
WBC # BLD AUTO: 10.3 10^3/UL (ref 4–10.5)

## 2018-09-10 PROCEDURE — 84443 ASSAY THYROID STIM HORMONE: CPT

## 2018-09-10 PROCEDURE — 83036 HEMOGLOBIN GLYCOSYLATED A1C: CPT

## 2018-09-10 PROCEDURE — 84481 FREE ASSAY (FT-3): CPT

## 2018-09-10 PROCEDURE — 87040 BLOOD CULTURE FOR BACTERIA: CPT

## 2018-09-10 PROCEDURE — 93010 ELECTROCARDIOGRAM REPORT: CPT

## 2018-09-10 PROCEDURE — 83735 ASSAY OF MAGNESIUM: CPT

## 2018-09-10 PROCEDURE — 84439 ASSAY OF FREE THYROXINE: CPT

## 2018-09-10 PROCEDURE — 80048 BASIC METABOLIC PNL TOTAL CA: CPT

## 2018-09-10 PROCEDURE — 84484 ASSAY OF TROPONIN QUANT: CPT

## 2018-09-10 PROCEDURE — 99285 EMERGENCY DEPT VISIT HI MDM: CPT

## 2018-09-10 PROCEDURE — 80061 LIPID PANEL: CPT

## 2018-09-10 PROCEDURE — 81001 URINALYSIS AUTO W/SCOPE: CPT

## 2018-09-10 PROCEDURE — 71045 X-RAY EXAM CHEST 1 VIEW: CPT

## 2018-09-10 PROCEDURE — 93306 TTE W/DOPPLER COMPLETE: CPT

## 2018-09-10 PROCEDURE — 93005 ELECTROCARDIOGRAM TRACING: CPT

## 2018-09-10 PROCEDURE — 82962 GLUCOSE BLOOD TEST: CPT

## 2018-09-10 PROCEDURE — 85025 COMPLETE CBC W/AUTO DIFF WBC: CPT

## 2018-09-10 PROCEDURE — 36415 COLL VENOUS BLD VENIPUNCTURE: CPT

## 2018-09-10 RX ADMIN — INSULIN GLARGINE SCH UNITS: 100 INJECTION, SOLUTION SUBCUTANEOUS at 21:50

## 2018-09-10 RX ADMIN — ATORVASTATIN CALCIUM SCH MG: 80 TABLET, FILM COATED ORAL at 21:49

## 2018-09-10 RX ADMIN — CALCITRIOL SCH MCG: 0.25 CAPSULE, LIQUID FILLED ORAL at 21:50

## 2018-09-10 RX ADMIN — HEPARIN SODIUM SCH UNIT: 5000 INJECTION, SOLUTION INTRAVENOUS; SUBCUTANEOUS at 21:50

## 2018-09-10 NOTE — ER DOCUMENT REPORT
ED General





- General


Chief Complaint: Shortness Of Breath


Stated Complaint: DIFFICULTY BREATHING/NAUSEA


Time Seen by Provider: 09/10/18 16:05


TRAVEL OUTSIDE OF THE [place] IN LAST 30 DAYS: No





- HPI


Notes: 





Patient is a 68-year-old male that presents to the emergency department for 

chief complaint of shortness of breath and renal failure.


Patient presents to emergency room upon the request of his nephrologist Dr. Grewal for worsening renal failure.  Patient is not currently on dialysis but 

has had stage IV CKD 4 years.  His creatinine was over 5 a few days ago and he 

has had progressive shortness of breath.  Patient's nephrologist may start him 

on dialysis tomorrow which patient states he is okay with.  He reports 3 weeks 

of worsening shortness of breath and cough which is nonproductive.  He denies 

fevers and chest pain.  He has had decreased appetite and oral intake but 

denies any vomiting or diarrhea.  He does endorse some nausea.





Past Medical History: CKD





Past Surgical History: Reviewed in chart





Social History: Former smoker, denies drugs and alcohol





Family History: Reviewed and noncontributory for presenting illness





Allergies: Reviewed, see documented allergy list.








REVIEW OF SYSTEMS:





CONSTITUTIONAL : 





No fever





No chills





No diaphoresis





No recent illness





EENT:





No vision changes





No congestion





No sore throat  





CARDIOVASCULAR:





No chest pain





No palpitations








RESPIRATORY:





shortness of breath





 cough





No difficulty breathing





GASTROINTESTINAL: 





No abdominal pain





 nausea





No vomiting





No diarrhea





GENITOURINARY:





No dysuria





No hematuria





No difficulty urinating





MUSCULOSKELETAL:





No back pain





No leg pain





No arm pain





SKIN:  





No rashes





No lesions





LYMPHATIC: 





No swollen, enlarged glands.





NEUROLOGICAL: 





No lightheadedness





No headache





No weakness





No paresthesias





PSYCHIATRIC:





No anxiety





No depression 








PHYSICAL EXAMINATION:





Vital signs reviewed, nursing noted reviewed.





GENERAL: Well-appearing, well-nourished and in no acute distress.





HEAD: Atraumatic, normocephalic.





EYES: Eyes appear normal, extraocular movements intact, sclera anicteric, 

conjunctiva are normal.





ENT: nares patent, oropharynx clear without exudates.  Moist mucous membranes.





NECK: Normal range of motion, supple without lymphadenopathy





LUNGS: lungs diminished bilaterally and no accessory muscle use or respiratory 

distress





HEART: Regular rate and rhythm without murmurs





ABDOMEN: Soft, nontender, normoactive bowel sounds.  No rebound, guarding, or 

rigidity. No masses appreciated.





EXTREMITIES: Nontender, good range of motion.  +2 bilateral pitting edema, 

symmetric





NEUROLOGICAL: No focal neurological deficits. Moves all extremities 

spontaneously Motor and sensory grossly intact on exam.





PSYCH: Normal mood, normal affect.





SKIN: Warm, Dry, normal turgor, no rashes or lesions noted on exposed skin











- Related Data


Allergies/Adverse Reactions: 


 





lisinopril [From Zestril] Allergy (Severe, Verified 09/10/18 14:23)


 











Past Medical History





- Social History


Smoking Status: Former Smoker


Chew tobacco use (# tins/day): No


Frequency of alcohol use: None


Drug Abuse: None


Family History: Reviewed & Not Pertinent


Patient has suicidal ideation: No


Patient has homicidal ideation: No





- Past Medical History


Cardiac Medical History: Reports: Hx Coronary Artery Disease, Hx Hypertension


   Denies: Hx Heart Attack


Pulmonary Medical History: 


   Denies: Hx Asthma, Hx Bronchitis, Hx COPD, Hx Pneumonia


Neurological Medical History: Denies: Hx Cerebrovascular Accident


Renal/ Medical History: Reports: Hx End Stage Renal Disease.  Denies: Hx 

Peritoneal Dialysis


Musculoskeletal Medical History: Reports Hx Arthritis





- Immunizations


Hx Diphtheria, Pertussis, Tetanus Vaccination: Yes





Review of Systems





- Review of Systems


Notes: 





Dictated





Physical Exam





- Vital signs


Vitals: 


 











Temp Pulse Resp BP Pulse Ox


 


 97.5 F   73   20   188/101 H  100 


 


 09/10/18 14:39  09/10/18 14:39  09/10/18 14:39  09/10/18 14:39  09/10/18 14:39














- Notes


Notes: 





Dictated





Course





- Re-evaluation


Re-evalutation: 





09/10/18 18:06


Vitals reviewed.  Patient does not have Sirs criteria and is not septic.  Chest 

x-ray shows right-sided infiltrate and he was given a dose of Levaquin for 

pneumonia.  Patient's creatinine is greater than 5 however his potassium is 

stable.  His troponin is also elevated at 0.109 which is likely related to his 

renal insufficiency.  He does not have any previous troponins for comparison.  

He was given aspirin for his elevated troponin.  EKG showed no ischemia.  He is 

not requiring emergent dialysis.  He will be admitted to the hospital for 

further management of his renal failure and pneumonia.  Patient in agreement 

with the plan.  He was stable at time of admission.  Case discussed with 

admitting physician Dr. Weiland


09/10/18 18:07





 





Chest X-Ray  09/10/18 16:11


IMPRESSION:  1.  Patchy airspace disease suggested in the right upper -mid lung 

zones may be on the basis of infiltrate.


 














Laboratory











  09/10/18 09/10/18 09/10/18





  16:45 16:45 16:45


 


WBC  10.3  


 


RBC  4.41  


 


Hgb  13.3 L  


 


Hct  40.6  


 


MCV  92  


 


MCH  30.1  


 


MCHC  32.8  


 


RDW  16.3 H  


 


Plt Count  223  


 


Seg Neutrophils %  73.7  


 


Lymphocytes %  14.0  


 


Monocytes %  8.9  


 


Eosinophils %  2.2  


 


Basophils %  1.2  


 


Absolute Neutrophils  7.6  


 


Absolute Lymphocytes  1.4  


 


Absolute Monocytes  0.9  


 


Absolute Eosinophils  0.2  


 


Absolute Basophils  0.1  


 


Sodium   141.4 


 


Potassium   3.6 


 


Chloride   104 


 


Carbon Dioxide   23 


 


Anion Gap   14 


 


BUN   67 H 


 


Creatinine   5.75 H 


 


Est GFR ( Amer)   12 L 


 


Est GFR (Non-Af Amer)   10 L 


 


Glucose   148 H 


 


Calcium   9.5 


 


Troponin I    0.109











09/10/18 18:13








- Vital Signs


Vital signs: 


 











Temp Pulse Resp BP Pulse Ox


 


 97.5 F   73   20   188/101 H  100 


 


 09/10/18 14:39  09/10/18 14:39  09/10/18 14:39  09/10/18 14:39  09/10/18 14:39














- Laboratory


Result Diagrams: 


 09/10/18 16:45





 09/10/18 16:45


Laboratory results interpreted by me: 


 











  09/10/18 09/10/18





  16:45 16:45


 


Hgb  13.3 L 


 


RDW  16.3 H 


 


BUN   67 H


 


Creatinine   5.75 H


 


Est GFR ( Amer)   12 L


 


Est GFR (Non-Af Amer)   10 L


 


Glucose   148 H














- EKG Interpretation by Me


Additional EKG results interpreted by me: 





09/10/18 18:08


AV paced rhythm, rate 70, , left axis, no ectopy





Discharge





- Discharge


Clinical Impression: 


Renal failure (ARF), acute on chronic


Qualifiers:


 Acute renal failure type: unspecified Chronic kidney disease stage: stage 4 (

severe) Qualified Code(s): N17.9 - Acute kidney failure, unspecified; N18.4 - 

Chronic kidney disease, stage 4 (severe); N18.4 - Chronic kidney disease, stage 

4 (severe); N18.4 - Chronic kidney disease, stage 4 (severe); N18.4 - Chronic 

kidney disease, stage 4 (severe)





Pneumonia


Qualifiers:


 Pneumonia type: due to unspecified organism Laterality: right Lung location: 

unspecified part of lung Qualified Code(s): J18.9 - Pneumonia, unspecified 

organism





Condition: Stable


Disposition: ADMITTED AS INPATIENT


Admitting Provider: Hospitalist


Unit Admitted: Medical Floor


Referrals: 


SONAL GREWAL MD [Primary Care Provider] - Follow up as needed

## 2018-09-10 NOTE — PDOC H&P
History of Present Illness


Admission Date/PCP: 


  09/10/18 19:25





  SONAL GREWAL MD





Patient complains of: "Feeling bad"


History of Present Illness: 


ABELARDO CID is a 68 year old male who has transitioned from CKD stage 

IV CKD stage V few months ago.  Tells me that for the last 3 weeks he has been 

declining and he follows with Dr. Grewal who is his nephrologist.  He has been 

having progressive shortness of breath associated with new anxiety attacks, 

decreased appetite, nausea but denies vomiting, generalized weakness, 

generalized cold sensation, he has not been sleeping well in the last 2 weeks, 

also complains of increased lower extremity swelling and decreased urinary 

output.  Decreased bowel movement secondary to his poor oral intake.


He had an appointment with cardiology last Monday he had a MUGA scan which was 

negative.


Dr. Grewal was contacted in the emergency department and patient is planned for 

hemodialysis tomorrow.


Chest x-ray shows right-sided infiltrate which is most likely fluid.








Past Medical History


Cardiac Medical History: Reports: Coronary Artery Disease - 4 stents placed 

2017, Hypertension


   Denies: Myocardial Infarction


Pulmonary Medical History: Reports: Other - Asbestosis


   Denies: Asthma, Bronchitis, Chronic Obstructive Pulmonary Disease (COPD), 

Pneumonia


Endocrine Medical History: Reports: Diabetes Mellitus Type 2


Renal/ Medical History: Reports: End Stage Renal Disease


Musculoskeltal Medical History: Reports: Arthritis


Hematology: Reports: Anemia





Past Surgical History


Past Surgical History: 





Septum repair


Past Surgical History: Reports: Cardiac Catheterization, Gastric Bypass Surgery

, Pacemaker, Other





Social History


Smoking Status: Former Smoker - Quit smoking in , used to smoke 1 pack/day


Frequency of Alcohol Use: None


Drugs: None


Past Social History Note: 





Lives with his wife who is at the bedside





Family History


Family History: Reviewed & Not Pertinent


Family History: 





Mother with history of myocardial infarction,  at 76 years old.  Father 

with history of asbestosis


Parental Family History Reviewed: Yes - As above


Children Family History Reviewed: NA


Sibling(s) Family History Reviewed.: NA





Medication/Allergy


Home Medications: 








Allopurinol [Zyloprim 100 mg Tablet] 100 mg PO DAILY 09/10/18 


Aspirin [Aspirin EC] 81 mg PO DAILY 09/10/18 


Atorvastatin Calcium [Lipitor 80 mg Tablet] 80 mg PO QHS 09/10/18 


Calcitriol 0.25 mcg PO BID 09/10/18 


Furosemide [Lasix 20 mg Tablet] 20 mg PO BID 09/10/18 


Insulin Glargine,Hum.rec.anlog [Lantus Solostar] 15 units SQ QHS 09/10/18 


Loratadine [Claritin] 10 mg PO DAILY 09/10/18 


Metoprolol Succinate [Toprol Xl] 200 mg PO DAILY 09/10/18 








Allergies/Adverse Reactions: 


 





lisinopril [From Zestril] Allergy (Severe, Verified 09/10/18 21:14)


 











Review of Systems


Review of Systems: 





As outlined in the HPI, all others negative





Physical Exam


Vital Signs: 


 











Temp Pulse Resp BP Pulse Ox


 


 97.5 F   73   20   188/101 H  100 


 


 09/10/18 14:39  09/10/18 14:39  09/10/18 14:39  09/10/18 14:39  09/10/18 14:39











Additional comments: 





General appearance: Well-developed, obese, alert and cooperative, and appears 

to be in no acute distress


Head: Normocephalic


Eyes: PEERL, EOMI, vision is grossly intact.  Ears: External auditory canal and 

tympanic membranes clear, hearing grossly intact.  Nose: No nasal discharge.  

Throat: Oral cavity and pharynx normal.  No inflammation, swelling, exudate or 

lesions.


Neck: Neck supple, nontender without lymphadenopathy, masses or thyromegaly.


Cardiac: Normal S1 and S2.  No S3, S4 or murmurs.  Rhythm is regular.  There is 

no cyanosis


Lungs: Clear to auscultation and percussion without rales, rhonchi, wheezing or 

diminished breath sounds.  Not using accessory muscles.


Abdomen: Positive bowel sounds.  Soft.  Nondistended, nontender.  No guarding 

or rebound.  No masses.  


Extremities: No significant deformity or joint abnormality.  2+ lower 

extremities pitting edema with chronic skin changes and few excoriations.  

Peripheral pulses intact. 


Neurological: Cranial nerves II through XII grossly intact.  Strength and 

sensation symmetric and intact throughout.  Reflexes 2+ throughout.


Skin: Skin normal color, no texture and turgor with excoriation in lower 

extremities no eruptions, warm and dry.


Psychiatric:  The mental examination revealed the patient was oriented to person

, place, and time.  The patient was able to demonstrate good judgment on recent

, without hallucinations, abnormal affect or abnormal behaviors.





Results


Laboratory Results: 





 











  09/10/18 09/10/18 09/10/18





  16:45 16:45 16:45


 


WBC  10.3  


 


RBC  4.41  


 


Hgb  13.3 L  


 


Hct  40.6  


 


MCV  92  


 


MCH  30.1  


 


MCHC  32.8  


 


RDW  16.3 H  


 


Plt Count  223  


 


Seg Neutrophils %  73.7  


 


Lymphocytes %  14.0  


 


Monocytes %  8.9  


 


Eosinophils %  2.2  


 


Basophils %  1.2  


 


Absolute Neutrophils  7.6  


 


Absolute Lymphocytes  1.4  


 


Absolute Monocytes  0.9  


 


Absolute Eosinophils  0.2  


 


Absolute Basophils  0.1  


 


Sodium   141.4 


 


Potassium   3.6 


 


Carbon Dioxide   23 


 


Anion Gap   14 


 


BUN   67 H 


 


Creatinine   5.75 H 


 


Est GFR ( Amer)   12 L 


 


Est GFR (Non-Af Amer)   10 L 


 


Glucose   148 H 


 


Calcium   9.5 


 


Troponin I    0.109








Impressions: 


 





Chest X-Ray  09/10/18 16:11


IMPRESSION:  1.  Patchy airspace disease suggested in the right upper -mid lung 

zones may be on the basis of infiltrate.


 














Assessment & Plan





- Diagnosis


(1) ESRD needing dialysis


Is this a current diagnosis for this admission?: Yes   


Plan: 


Patient has transition from CKD stage IV CKD stage V for the last few months, 

has been avoiding hemodialysis but symptoms has been worsening for the last 3 

weeks.  Patient follows with Dr. Tre Grewal who most likely will plan for 

hemodialysis tomorrow, I am awaiting his call back.  In the meantime I placed a 

consultation for him on a consultation for surgery for permacath placement 

unless otherwise indicated by nephrology.  Input and output every 8 hours.  

Oxygen protocol via nasal cannula if needed.  Continue calcitriol


Patient has elevated troponins 0.109, likely secondary to renal disease.


Has a patchy infiltrate in the chest x-ray that I believe is secondary to 

pulmonary edema, she does not have any signs or symptoms of pneumonia, has 

received Levaquin in the ED.  I am going to place him on antibiotics.








(2) Hypertension


Is this a current diagnosis for this admission?: Yes   


Plan: 


Blood pressure has been elevated 188/101, place IV Lopressor as needed and I am 

resuming his home antihypertensive medications, continue metoprolol.








(3) Diabetes mellitus type 2 in obese


Is this a current diagnosis for this admission?: Yes   


Plan: 


Accu-Cheks q. before meals and at bedtime, insulin lispro sliding scale, 

hypoglycemia protocol.








(4) CAD (coronary artery disease)


Is this a current diagnosis for this admission?: Yes   


Plan: 


Coronary arterial disease with 4 stents in place, continue aspirin.  To be on 

telemetry monitoring.  Has a pacemaker placed secondary to bradycardia.








- Time


Time Spent: 30 to 50 Minutes





- Inpatient Certification


Based on my medical assessment, after consideration of the patient's 

comorbidities, presenting symptoms, or acuity I expect that the services needed 

warrant INPATIENT care.: Yes


I certify that my determination is in accordance with my understanding of 

Medicare's requirements for reasonable and necessary INPATIENT services [42 CFR 

412.3e].: Yes


Medical Necessity: Risk of Complication if Not Cared For in Hospital

## 2018-09-10 NOTE — RADIOLOGY REPORT (SQ)
EXAM DESCRIPTION:  CHEST SINGLE VIEW



COMPLETED DATE/TIME:  9/10/2018 4:34 pm



REASON FOR STUDY:  sob



COMPARISON:  None.



EXAM PARAMETERS:  NUMBER OF VIEWS: One view.

TECHNIQUE: Single frontal radiographic view of the chest acquired.

RADIATION DOSE: NA

LIMITATIONS: None.



FINDINGS:  LUNGS AND PLEURA:  Patchy airspace disease suggested in the right upper- mid lung zones.  
No pneumothorax or pleural effusion.

MEDIASTINUM AND HILAR STRUCTURES: No masses.  Contour normal.

HEART AND VASCULAR STRUCTURES: Heart normal in size.  Normal vasculature.

BONES: No acute findings.

HARDWARE:  Cardiac pacemaker.

OTHER: No other significant finding.



IMPRESSION:  1.  Patchy airspace disease suggested in the right upper -mid lung zones may be on the b
asis of infiltrate.



TECHNICAL DOCUMENTATION:  JOB ID:  [id number]

 2011 [employer]- All Rights Reserved



Reading location - IP/workstation name: [email or web address]

## 2018-09-10 NOTE — EKG REPORT
SEVERITY:- ABNORMAL ECG -

ATRIAL-VENTRICULAR DUAL-PACED RHYTHM

:

Confirmed by: Onur Lockwood 10-Sep-2018 23:00:47

## 2018-09-11 LAB
ADD MANUAL DIFF: NO
ANION GAP SERPL CALC-SCNC: 15 MMOL/L (ref 5–19)
APPEARANCE UR: (no result)
APTT PPP: YELLOW S
BASOPHILS # BLD AUTO: 0.1 10^3/UL (ref 0–0.2)
BASOPHILS NFR BLD AUTO: 1.3 % (ref 0–2)
BILIRUB UR QL STRIP: NEGATIVE
BUN SERPL-MCNC: 68 MG/DL (ref 7–20)
CALCIUM: 9.6 MG/DL (ref 8.4–10.2)
CHLORIDE SERPL-SCNC: 104 MMOL/L (ref 98–107)
CHOLEST SERPL-MCNC: 118.95 MG/DL (ref 0–200)
CO2 SERPL-SCNC: 21 MMOL/L (ref 22–30)
EOSINOPHIL # BLD AUTO: 0.3 10^3/UL (ref 0–0.6)
EOSINOPHIL NFR BLD AUTO: 2.3 % (ref 0–6)
ERYTHROCYTE [DISTWIDTH] IN BLOOD BY AUTOMATED COUNT: 16.5 % (ref 11.5–14)
FREE T4 (FREE THYROXINE): 1.49 NG/DL (ref 0.78–2.19)
GLUCOSE SERPL-MCNC: 155 MG/DL (ref 75–110)
GLUCOSE UR STRIP-MCNC: >=500 MG/DL
HCT VFR BLD CALC: 37.8 % (ref 37.9–51)
HGB BLD-MCNC: 12.6 G/DL (ref 13.5–17)
KETONES UR STRIP-MCNC: NEGATIVE MG/DL
LDLC SERPL DIRECT ASSAY-MCNC: 46 MG/DL (ref ?–100)
LYMPHOCYTES # BLD AUTO: 1.3 10^3/UL (ref 0.5–4.7)
LYMPHOCYTES NFR BLD AUTO: 11.7 % (ref 13–45)
MCH RBC QN AUTO: 30.4 PG (ref 27–33.4)
MCHC RBC AUTO-ENTMCNC: 33.4 G/DL (ref 32–36)
MCV RBC AUTO: 91 FL (ref 80–97)
MONOCYTES # BLD AUTO: 1.3 10^3/UL (ref 0.1–1.4)
MONOCYTES NFR BLD AUTO: 11.1 % (ref 3–13)
NEUTROPHILS # BLD AUTO: 8.4 10^3/UL (ref 1.7–8.2)
NEUTS SEG NFR BLD AUTO: 73.6 % (ref 42–78)
NITRITE UR QL STRIP: NEGATIVE
PH UR STRIP: 5 [PH] (ref 5–9)
PLATELET # BLD: 213 10^3/UL (ref 150–450)
POTASSIUM SERPL-SCNC: 3.8 MMOL/L (ref 3.6–5)
PROT UR STRIP-MCNC: >=500 MG/DL
RBC # BLD AUTO: 4.15 10^6/UL (ref 4.35–5.55)
SODIUM SERPL-SCNC: 140.1 MMOL/L (ref 137–145)
SP GR UR STRIP: 1.01
T3FREE SERPL-MCNC: 3.11 PG/ML (ref 2.77–5.27)
TOTAL CELLS COUNTED % (AUTO): 100 %
TRIGL SERPL-MCNC: 153 MG/DL (ref ?–150)
TSH SERPL-ACNC: 2.28 UIU/ML (ref 0.47–4.68)
UROBILINOGEN UR-MCNC: NEGATIVE MG/DL (ref ?–2)
VLDLC SERPL CALC-MCNC: 30.6 MG/DL (ref 10–31)
WBC # BLD AUTO: 11.4 10^3/UL (ref 4–10.5)

## 2018-09-11 RX ADMIN — ATORVASTATIN CALCIUM SCH MG: 80 TABLET, FILM COATED ORAL at 21:09

## 2018-09-11 RX ADMIN — INSULIN GLARGINE SCH UNITS: 100 INJECTION, SOLUTION SUBCUTANEOUS at 21:14

## 2018-09-11 RX ADMIN — HEPARIN SODIUM SCH UNIT: 5000 INJECTION, SOLUTION INTRAVENOUS; SUBCUTANEOUS at 13:55

## 2018-09-11 RX ADMIN — Medication PRN EACH: at 03:15

## 2018-09-11 RX ADMIN — Medication PRN EACH: at 05:27

## 2018-09-11 RX ADMIN — HEPARIN SODIUM SCH UNIT: 5000 INJECTION, SOLUTION INTRAVENOUS; SUBCUTANEOUS at 05:27

## 2018-09-11 RX ADMIN — INSULIN LISPRO PRN UNITS: 100 INJECTION, SOLUTION INTRAVENOUS; SUBCUTANEOUS at 21:14

## 2018-09-11 RX ADMIN — HEPARIN SODIUM SCH UNIT: 5000 INJECTION, SOLUTION INTRAVENOUS; SUBCUTANEOUS at 21:10

## 2018-09-11 RX ADMIN — INSULIN LISPRO PRN UNITS: 100 INJECTION, SOLUTION INTRAVENOUS; SUBCUTANEOUS at 08:41

## 2018-09-11 RX ADMIN — HYDRALAZINE HYDROCHLORIDE PRN MG: 20 INJECTION INTRAMUSCULAR; INTRAVENOUS at 12:26

## 2018-09-11 RX ADMIN — INSULIN LISPRO PRN UNITS: 100 INJECTION, SOLUTION INTRAVENOUS; SUBCUTANEOUS at 17:28

## 2018-09-11 RX ADMIN — CALCITRIOL SCH MCG: 0.25 CAPSULE, LIQUID FILLED ORAL at 21:09

## 2018-09-11 RX ADMIN — CALCITRIOL SCH MCG: 0.25 CAPSULE, LIQUID FILLED ORAL at 09:28

## 2018-09-11 RX ADMIN — HYDRALAZINE HYDROCHLORIDE PRN MG: 20 INJECTION INTRAMUSCULAR; INTRAVENOUS at 03:15

## 2018-09-11 RX ADMIN — INSULIN LISPRO PRN UNITS: 100 INJECTION, SOLUTION INTRAVENOUS; SUBCUTANEOUS at 12:27

## 2018-09-11 NOTE — PDOC CONSULTATION
Consultation


Consult Date: 09/11/18


Consult reason:: JESUS in CKD 4





History of Present Illness


Admission Date/PCP: 


  09/10/18 19:25





  SONAL CAMPBELL MD





History of Present Illness: 


ABELARDO CID is a 68 year old male with a h/o poorly controlled and 

complicated DM, Hypertension, CKD 4 with base creatinine around 5.5, Asbestosis

  was admitted with progressive dyspnea x 2 weeks. There as no c/o of any 

orthopnea, chest pains, fever or chills.He also c/o of a cough with green 

expectoration x 4 weeks . He had PTCA last August and has had ischemic CMP .He 

says he saw his cardilogist last week and had a MUGA scan which apparently 

showed a LVEF of 45%. He also has not been able to sleep.He has lost appetite 

and has had intermittent nausea.Labs and medications were reviewed with him and 

the treating RN. also discussed him with hte treating hospitalist Dr Dahl.








Past Medical History


Cardiac Medical History: Reports: CHF-Systolic - /Ischemic CMP with last week 

MUGA apparently showing EF of 45%., Coronary Artery Disease - 4 stents placed 

August 2017, Hypertension-primary


   Denies: Myocardial Infarction


Pulmonary Medical History: Reports: Other - Asbestosis


   Denies: Asthma, Bronchitis, Chronic Obstructive Pulmonary Disease (COPD), 

Pneumonia


Endocrine Medical History: Reports: Diabetes Mellitus Type 2


Renal/ Medical History: Reports: Chronic Kidney Disease Stage IV, Secondary 

Hyperparathyroidism


Musculoskeltal Medical History: Reports: Arthritis


Psychiatric Medical History: Reports: Depression





Past Surgical History


Past Surgical History: Reports: Cardiac Catheterization, Gastric Bypass Surgery

, Pacemaker, Other





Social History


Smoking Status: Former Smoker


Frequency of Alcohol Use: None


Hx Recreational Drug Use: No


Drugs: None


Hx Prescription Drug Abuse: No





Family History


Parental Family History Reviewed: Yes - negative for ESRD


Children Family History Reviewed: No


Sibling(s) Family History Reviewed.: No





Medication/Allergy


Home Medications: 








Allopurinol [Zyloprim 100 mg Tablet] 100 mg PO DAILY 09/10/18 


Aspirin [Aspirin EC] 81 mg PO DAILY 09/10/18 


Atorvastatin Calcium [Lipitor 80 mg Tablet] 80 mg PO QHS 09/10/18 


Calcitriol 0.25 mcg PO BID 09/10/18 


Furosemide [Lasix 20 mg Tablet] 20 mg PO BID 09/10/18 


Insulin Glargine,Hum.rec.anlog [Lantus Solostar] 15 units SQ QHS 09/10/18 


Loratadine [Claritin] 10 mg PO DAILY 09/10/18 


Metoprolol Succinate [Toprol Xl] 200 mg PO DAILY 09/10/18 


Clonidine HCl [Catapres] 0.1 mg PO QHS 09/11/18 








Allergies/Adverse Reactions: 


 





lisinopril [From Zestril] Allergy (Severe, Verified 09/10/18 21:14)


 











Review of Systems


Constitutional: PRESENT: anorexia, fatigue, weakness.  ABSENT: fever(s), 

headache(s), night sweats


Nose, Mouth, and Throat: ABSENT: mouth pain, sore throat


Cardiovascular: PRESENT: dyspnea on exertion, edema.  ABSENT: chest pain, 

orthropnea, palpitations


Respiratory: PRESENT: cough, dyspnea.  ABSENT: hemoptysis


Gastrointestinal: PRESENT: nausea.  ABSENT: abdominal pain, diarrhea, dysphagia

, hematemesis, hematochezia, melena


Genitourinary: ABSENT: dysuria, hematuria


Integumentary: ABSENT: erythema, lesions, pruritus, rash


Neurological: ABSENT: abnormal speech, confusion, convulsions, focal weakness


Psychiatric: PRESENT: depression - ?


Hematologic/Lymphatic: ABSENT: easy bruising, lymphadenopathy





Physical Exam


Vital Signs: 


 











Temp Pulse Resp BP Pulse Ox


 


 97.7 F   59 L  16   189/95 H  96 


 


 09/11/18 12:11  09/11/18 12:11  09/11/18 12:11  09/11/18 12:11  09/11/18 12:11








 Intake & Output











 09/10/18 09/11/18 09/12/18





 06:59 06:59 06:59


 


Intake Total  600 


 


Output Total  525 


 


Balance  75 


 


Weight  108 kg 











General appearance: PRESENT: no acute distress


Eye exam: PRESENT: EOMI, PERRLA


Mouth exam: PRESENT: moist, neck supple


Neck exam: ABSENT: lymphadenopathy, meningismus, tenderness, thyromegaly, 

tracheal deviation


Respiratory exam: PRESENT: clear to auscultation ginger.  ABSENT: crackles


Cardiovascular exam: PRESENT: +S1, +S2, systolic murmur.  ABSENT: rubs


GI/Abdominal exam: PRESENT: normal bowel sounds, soft.  ABSENT: diminished 

bowel sounds, organomegaly, tenderness


Extremities exam: PRESENT: +1 edema


Neurological exam: PRESENT: alert, awake, oriented to person, oriented to place


Psychiatric exam: PRESENT: flat affect


Skin exam: ABSENT: erythema, mottled, rash





Results


Laboratory Results: 


 





 09/11/18 05:29 





 09/11/18 05:29 





 











  09/11/18 09/11/18 09/11/18





  02:30 05:29 05:29


 


WBC   11.4 H 


 


RBC   4.15 L 


 


Hgb   12.6 L 


 


Hct   37.8 L 


 


MCV   91 


 


MCH   30.4 


 


MCHC   33.4 


 


RDW   16.5 H 


 


Plt Count   213 


 


Seg Neutrophils %   73.6 


 


Lymphocytes %   11.7 L 


 


Monocytes %   11.1 


 


Eosinophils %   2.3 


 


Basophils %   1.3 


 


Absolute Neutrophils   8.4 H 


 


Absolute Lymphocytes   1.3 


 


Absolute Monocytes   1.3 


 


Absolute Eosinophils   0.3 


 


Absolute Basophils   0.1 


 


Sodium    140.1


 


Potassium    3.8


 


Chloride    104


 


Carbon Dioxide    21 L


 


Anion Gap    15


 


BUN    68 H


 


Creatinine    5.96 H


 


Est GFR ( Amer)    11 L


 


Est GFR (Non-Af Amer)    9 L


 


Glucose    155 H


 


Calcium    9.6


 


Magnesium    2.1


 


Triglycerides    153 H


 


Cholesterol    118.95


 


LDL Cholesterol Direct    46


 


VLDL Cholesterol    30.6


 


HDL Cholesterol    43


 


TSH   


 


Free T4   


 


Free T3 pg/mL   


 


Urine Color  YELLOW  


 


Urine Appearance  SLIGHTLY-CLOUDY  


 


Urine pH  5.0  


 


Ur Specific Gravity  1.015  


 


Urine Protein  >=500 H  


 


Urine Glucose (UA)  >=500 H  


 


Urine Ketones  NEGATIVE  


 


Urine Blood  MODERATE H  


 


Urine Nitrite  NEGATIVE  


 


Ur Leukocyte Esterase  NEGATIVE  


 


Urine WBC (Auto)  2  


 


Urine RBC (Auto)  1  














  09/11/18





  05:29


 


WBC 


 


RBC 


 


Hgb 


 


Hct 


 


MCV 


 


MCH 


 


MCHC 


 


RDW 


 


Plt Count 


 


Seg Neutrophils % 


 


Lymphocytes % 


 


Monocytes % 


 


Eosinophils % 


 


Basophils % 


 


Absolute Neutrophils 


 


Absolute Lymphocytes 


 


Absolute Monocytes 


 


Absolute Eosinophils 


 


Absolute Basophils 


 


Sodium 


 


Potassium 


 


Chloride 


 


Carbon Dioxide 


 


Anion Gap 


 


BUN 


 


Creatinine 


 


Est GFR ( Amer) 


 


Est GFR (Non-Af Amer) 


 


Glucose 


 


Calcium 


 


Magnesium 


 


Triglycerides 


 


Cholesterol 


 


LDL Cholesterol Direct 


 


VLDL Cholesterol 


 


HDL Cholesterol 


 


TSH  2.28


 


Free T4  1.49


 


Free T3 pg/mL  3.11


 


Urine Color 


 


Urine Appearance 


 


Urine pH 


 


Ur Specific Gravity 


 


Urine Protein 


 


Urine Glucose (UA) 


 


Urine Ketones 


 


Urine Blood 


 


Urine Nitrite 


 


Ur Leukocyte Esterase 


 


Urine WBC (Auto) 


 


Urine RBC (Auto) 











Impressions: 


 





Chest X-Ray  09/10/18 16:11


IMPRESSION:  1.  Patchy airspace disease suggested in the right upper -mid lung 

zones may be on the basis of infiltrate.


 














Assessment & Plan





- Diagnosis


(1) Renal failure (ARF), acute on chronic


Qualifiers: 


   Acute renal failure type: unspecified   Chronic kidney disease stage: stage 

4 (severe)   Qualified Code(s): N17.9 - Acute kidney failure, unspecified; 

N18.4 - Chronic kidney disease, stage 4 (severe); N18.4 - Chronic kidney disease

, stage 4 (severe); N18.4 - Chronic kidney disease, stage 4 (severe); N18.4 - 

Chronic kidney disease, stage 4 (severe)   


Plan: 


Possible early features of uremia. Has fluid overload with early CHF ? cor 

pulmonale.Treat with IV lasix.Suspect underlying Ischemic CMP as a probable 

cause of pre renal failure.If poor response to conservative measures might need 

RRT. Discussed at length with patient








(2) Acute bronchitis


Plan: 


Continue on Levoflox








(3) CAD (coronary artery disease)


Qualifiers: 


   Coronary Disease-Associated Artery/Lesion type: native artery   Native vs. 

transplanted heart: native heart   Associated angina: angina presence 

unspecified   Qualified Code(s): I25.10 - Atherosclerotic heart disease of 

native coronary artery without angina pectoris   


Is this a current diagnosis for this admission?: Yes   


Plan: 


S/P Ptca in August 2017 and had amos procedural poor LVEF of around low 30 %.








(4) Diabetes mellitus type 2 in obese


Is this a current diagnosis for this admission?: Yes   


Plan: 


Poorly controlled.








(5) Hypertension


Qualifiers: 


   Hypertension type: essential hypertension   Qualified Code(s): I10 - 

Essential (primary) hypertension   


Is this a current diagnosis for this admission?: Yes   





(6) Asbestosis


Plan: 


Has had Ct documentaion of it in the past.








(7) CHF (congestive heart failure)


Qualifiers: 


   Heart failure type: combined systolic and diastolic   Heart failure 

chronicity: acute on chronic   Qualified Code(s): I50.43 - Acute on chronic 

combined systolic (congestive) and diastolic (congestive) heart failure   


Plan: 


Has underlying Ischemic CMP.Latest MUGA apparently as per patient was 45% and 

it has been in the low 30 % around the time he had the PTCA. I believe he 

probably could have progressive CAD leading to CHF and needs to be evaluated 

for that in a tertiary care center. I will start Plavix and start IV lasix and 

consult with Cardiology if we have one in house. Discussed with treating 

hospitalist Dr Dahl on the need to have him transferred to a tertiary center 

for obvious reasons especially in the face of coming storm.

## 2018-09-11 NOTE — PDOC CONSULTATION
Consultation


Consult Date: 09/11/18


Attending physician:: CARLOS STEPHENS


Consult reason:: Shortness of breath





History of Present Illness


Admission Date/PCP: 


  09/10/18 19:25





  SONAL GREWAL MD





Patient complains of: Shortness of breath


History of Present Illness: 


ABELARDO CID is a 68 year old male who has transitioned from CKD stage 

IV CKD stage V few months ago.  Tells me that for the last 3 weeks he has been 

declining and he follows with Dr. Grewal who is his nephrologist.  He has been 

having progressive shortness of breath associated with new anxiety attacks, 

decreased appetite, nausea but denies vomiting, generalized weakness, 

generalized cold sensation, he has not been sleeping well in the last 2 weeks, 

also complains of increased lower extremity swelling and decreased urinary 

output.  Decreased bowel movement secondary to his poor oral intake.


He had an appointment with cardiology last Monday he had a MUGA scan which was 

negative.


Dr. Grewal was contacted in the emergency department and patient is planned for 

hemodialysis tomorrow.


Chest x-ray shows right-sided infiltrate which is most likely fluid. 


Patient gives history of multiple stents.  He claims that he had recent stress 

test but he is not aware of the results.  He sees a cardiologist at [place].  Patient has been followed by Dr. Grewal.


On questioning patient has admitted to be having some tightness in the chest 

off and on.  This would last a few minutes and then resolve spontaneously.  

Troponin I is noted to be elevated.  A 2D echo was performed which shows apical 

hypokinesia and overall reduced LVEF.  Echo was technically difficult.  

Regional wall motion cannot be accurately commented upon.








Past Medical History


Cardiac Medical History: Reports: Coronary Artery Disease - 4 stents placed 

August 2017, Hypertension


   Denies: Myocardial Infarction


Pulmonary Medical History: Reports: Other - Asbestosis


   Denies: Asthma, Bronchitis, Chronic Obstructive Pulmonary Disease (COPD), 

Pneumonia


Endocrine Medical History: Reports: Diabetes Mellitus Type 2


Renal/ Medical History: Reports: End Stage Renal Disease


Musculoskeltal Medical History: Reports: Arthritis


Psychiatric Medical History: Reports: Depression


Hematology: Reports: Anemia





Past Surgical History


Past Surgical History: Reports: Cardiac Catheterization, Gastric Bypass Surgery

, Pacemaker, Other





Social History


Information Source: Patient


Smoking Status: Former Smoker


Frequency of Alcohol Use: None


Hx Recreational Drug Use: No


Drugs: None


Hx Prescription Drug Abuse: No





- Advance Directive


Resuscitation Status: Full Code


Surrogate healthcare decision maker:: 





Patient's wife is the surrogate decision-maker





Family History


Family History: Hypertension


Parental Family History Reviewed: Yes


Children Family History Reviewed: Yes


Sibling(s) Family History Reviewed.: Yes





Medication/Allergy


Home Medications: 








Allopurinol [Zyloprim 100 mg Tablet] 100 mg PO DAILY 09/10/18 


Aspirin [Aspirin EC] 81 mg PO DAILY 09/10/18 


Atorvastatin Calcium [Lipitor 80 mg Tablet] 80 mg PO QHS 09/10/18 


Calcitriol 0.25 mcg PO BID 09/10/18 


Furosemide [Lasix 20 mg Tablet] 20 mg PO BID 09/10/18 


Insulin Glargine,Hum.rec.anlog [Lantus Solostar] 15 units SQ QHS 09/10/18 


Loratadine [Claritin] 10 mg PO DAILY 09/10/18 


Metoprolol Succinate [Toprol Xl] 200 mg PO DAILY 09/10/18 


Clonidine HCl [Catapres] 0.1 mg PO QHS 09/11/18 








Allergies/Adverse Reactions: 


 





lisinopril [From Zestril] Allergy (Severe, Verified 09/10/18 21:14)


 











Review of Systems


Review of Systems: 





Please see history of present illness and past medical history as wall.


Constitutional: No fever or chills reported.


Head : No recent chronic headaches, recent head injury.


Eyes: No recent eye pain, diplopia, redness, discharge, acute visual changes.


Ears: No recent chronic ear pain, acute hearing loss, ear discharge.


Oral cavity: No recent  ulcerations, bleeding, oral cavity discomfort.


Neck: No recent acute neck pain reported.


Hematologic: No recent easy bruising or bleeding.


Lymphatic: No recent lymph node enlargement reported.


Cardiovascular system review: See history of present illness.


Respiratory system review: No hemoptysis or blood clots in the lungs reported. 

Mild Shortness of breath on exertion


Gastrointestinal system review: Negative for any recent acute hematemesis, 

melena.  


Genitourinary system review: No recent acute or chronic hematuria, flank pain, 

UTI etc. reported.  Patient has history of severe chronic kidney disease.


Skin system review: Negative for any recent abnormal bruising, no rash, no 

pruritus reported.


Neurologic: No prior history of strokes, mini strokes, seizure disorder.


Psychologic: No history of major psychosis or major depression reported.


Musculoskeletal: Minor aches and pains reported.  No acute joint swelling 

reported.


Endocrine: No recent polyuria, polydipsia, recent heat or cold intolerance.





Physical Exam


Vital Signs: 


 











Temp Pulse Resp BP Pulse Ox


 


 97.7 F   64   16   166/84 H  96 


 


 09/11/18 12:11  09/11/18 16:37  09/11/18 16:37  09/11/18 16:37  09/11/18 16:37








 Intake & Output











 09/10/18 09/11/18 09/12/18





 06:59 06:59 06:59


 


Intake Total  600 901


 


Output Total  525 750


 


Balance  75 151


 


Weight  108 kg 











Exam: 








GENERAL: well-nourished and in no acute distress.  Alert and oriented x3


HEAD: Atraumatic, normocephalic.


EYES: Pupils equal round and reactive to light, extraocular movements intact, 

sclera anicteric, conjunctiva are normal.


ENT: TMs normal, nares patent, oropharynx clear without exudates. Moist mucous 

membranes.  No oral ulcerations or bleeding gums noted


NECK: supple without lymphadenopathy.  Trachea is central.  No cervical or 

axillary lymphadenopathy noted.  Carotids are 2+, JVD WNL 


LUNGS: Respiration seems nonlabored, no significant accessory muscle action 

noted.  Breath sounds clear to auscultation bilaterally and equal noted. No 

wheezes rales or rhonchi noted.  No significant dullness noted on percussion.


CHEST: Palpation of the chest wall shows no significant chest wall tenderness. 


HEART: Mal[place] NP, No PSH,  1/6 CHRISTINA aortic area, 1/6 pan systolic murmur mitral 

area, no rubs, no gallops.


ABDOMEN: Soft, no significant tenderness appreciated, normoactive bowel sounds. 

No guarding, no rebound.  No rigidity noted . No masses appreciated.


EXTREMITIES: Pedal pulses are 1-2+, no calf tenderness noted. No clubbing or 

cyanosis.  1+ pedal edema noted


NEUROLOGICAL: Focused neurological exam showed no significant neurologic 

deficit. Normal speech, no focal weakness appreciated. 


PSYCH: Normal mood, normal affect.  Judgment and insight within normal limits.


SKIN: No significant ecchymosis, skin is noted to be warm.


MUSCULOSKELETAL EXAM: No significant acute joint swelling noted.





Results


Laboratory Results: 


 





 09/11/18 05:29 





 09/11/18 05:29 





 











  09/11/18 09/11/18 09/11/18





  02:30 05:29 05:29


 


WBC   11.4 H 


 


RBC   4.15 L 


 


Hgb   12.6 L 


 


Hct   37.8 L 


 


MCV   91 


 


MCH   30.4 


 


MCHC   33.4 


 


RDW   16.5 H 


 


Plt Count   213 


 


Seg Neutrophils %   73.6 


 


Lymphocytes %   11.7 L 


 


Monocytes %   11.1 


 


Eosinophils %   2.3 


 


Basophils %   1.3 


 


Absolute Neutrophils   8.4 H 


 


Absolute Lymphocytes   1.3 


 


Absolute Monocytes   1.3 


 


Absolute Eosinophils   0.3 


 


Absolute Basophils   0.1 


 


Sodium    140.1


 


Potassium    3.8


 


Chloride    104


 


Carbon Dioxide    21 L


 


Anion Gap    15


 


BUN    68 H


 


Creatinine    5.96 H


 


Est GFR ( Amer)    11 L


 


Est GFR (Non-Af Amer)    9 L


 


Glucose    155 H


 


Calcium    9.6


 


Magnesium    2.1


 


Triglycerides    153 H


 


Cholesterol    118.95


 


LDL Cholesterol Direct    46


 


VLDL Cholesterol    30.6


 


HDL Cholesterol    43


 


TSH   


 


Free T4   


 


Free T3 pg/mL   


 


Urine Color  YELLOW  


 


Urine Appearance  SLIGHTLY-CLOUDY  


 


Urine pH  5.0  


 


Ur Specific Gravity  1.015  


 


Urine Protein  >=500 H  


 


Urine Glucose (UA)  >=500 H  


 


Urine Ketones  NEGATIVE  


 


Urine Blood  MODERATE H  


 


Urine Nitrite  NEGATIVE  


 


Ur Leukocyte Esterase  NEGATIVE  


 


Urine WBC (Auto)  2  


 


Urine RBC (Auto)  1  














  09/11/18





  05:29


 


WBC 


 


RBC 


 


Hgb 


 


Hct 


 


MCV 


 


MCH 


 


MCHC 


 


RDW 


 


Plt Count 


 


Seg Neutrophils % 


 


Lymphocytes % 


 


Monocytes % 


 


Eosinophils % 


 


Basophils % 


 


Absolute Neutrophils 


 


Absolute Lymphocytes 


 


Absolute Monocytes 


 


Absolute Eosinophils 


 


Absolute Basophils 


 


Sodium 


 


Potassium 


 


Chloride 


 


Carbon Dioxide 


 


Anion Gap 


 


BUN 


 


Creatinine 


 


Est GFR ( Amer) 


 


Est GFR (Non-Af Amer) 


 


Glucose 


 


Calcium 


 


Magnesium 


 


Triglycerides 


 


Cholesterol 


 


LDL Cholesterol Direct 


 


VLDL Cholesterol 


 


HDL Cholesterol 


 


TSH  2.28


 


Free T4  1.49


 


Free T3 pg/mL  3.11


 


Urine Color 


 


Urine Appearance 


 


Urine pH 


 


Ur Specific Gravity 


 


Urine Protein 


 


Urine Glucose (UA) 


 


Urine Ketones 


 


Urine Blood 


 


Urine Nitrite 


 


Ur Leukocyte Esterase 


 


Urine WBC (Auto) 


 


Urine RBC (Auto) 











EKG Comments: 





Shows AV paced rhythm.


Impressions: 


 





Chest X-Ray  09/10/18 16:11


IMPRESSION:  1.  Patchy airspace disease suggested in the right upper -mid lung 

zones may be on the basis of infiltrate.


 














Assessment & Plan





- Diagnosis


(1) CHF (congestive heart failure)


Qualifiers: 


   Heart failure type: combined systolic and diastolic   Heart failure 

chronicity: acute on chronic   Qualified Code(s): I50.43 - Acute on chronic 

combined systolic (congestive) and diastolic (congestive) heart failure   


Is this a current diagnosis for this admission?: Yes   





(2) CKD (chronic kidney disease) stage 5, GFR less than 15 ml/min


Is this a current diagnosis for this admission?: Yes   





(3) CAD (coronary artery disease)


Qualifiers: 


   Coronary Disease-Associated Artery/Lesion type: native artery   Native vs. 

transplanted heart: native heart   Associated angina: angina presence 

unspecified   Qualified Code(s): I25.10 - Atherosclerotic heart disease of 

native coronary artery without angina pectoris   


Is this a current diagnosis for this admission?: Yes   





(4) Diabetes mellitus type 2 in obese


Is this a current diagnosis for this admission?: Yes   





(5) Fluid overload


Is this a current diagnosis for this admission?: Yes   





(6) Hypertension


Qualifiers: 


   Hypertension type: essential hypertension   Qualified Code(s): I10 - 

Essential (primary) hypertension   


Is this a current diagnosis for this admission?: Yes   





(7) Elevated troponin I level


Is this a current diagnosis for this admission?: Yes   





- Notes


Notes: 





Congestive heart failure: Exact etiology not clear, could be fluid overload, 

could be ischemic could be transient arrhythmia related.  Patient will benefit 

from fluid removal on dialysis.  Patient will also benefit from optimization of 

CHF therapy.


Chronic kidney disease: Patient has severe chronic kidney disease.  Patient 

would benefit from starting dialysis.  Nephrology following.


Coronary artery disease: Patient has history of coronary stent placement.  

Patient has noted some tightness in the chest.  Patient will benefit from 

further evaluation.  It seems patient had recent nuclear stress test before 

next step could be heart catheterization.  Patient understands that he is going 

to end up needing dialysis anyway.


Diabetes: Currently uncontrolled.


Hypertension: Blood pressure goal should be 135/85 or less.


Elevated troponin I: Possibly ischemic syndrome, because of paced rhythm, 

cannot be very definitive about this being nonischemic.  Further evaluation 

with a stress test or heart catheterization


Patient tells me that Dr. Grewal  seem to be transferred to [facility] for 

any cardiac procedure if needed.  It also seems that patient would benefit from 

initiation of dialysis.  And the services may not be easily available.. 





- Time


Time Spent: 30 to 50 Minutes


Medications reviewed and adjusted accordingly: Yes

## 2018-09-11 NOTE — XCELERA REPORT
[address] [address]

                               Tel: [phone]

                               Fax: [phone]



                      Transthoracic Echocardiogram Report

_______________________________________________________________________________



Name: ABELARDO CID

MRN: [id number]                           Age: 68 yrs

Gender: Male                              : 1950

Patient Status: Inpatient                 Patient Location: [address] [place], [place] [address]A

Account #: [id number]

Study Date: 2018 11:17 AM

Accession #: [id number]

_______________________________________________________________________________



Height: 72 in        Weight: 238 lb        BSA: 2.3 m2

_______________________________________________________________________________

Procedure: A complete two-dimensional transthoracic echocardiogram was

performed (2D, M-mode, spectral and color flow Doppler). The study was

technically difficult with many images being suboptimal in quality.

Reason For Study: chf exacerbation





Ordering Physician: CARLOS STEPHENS

Performed By: AK



_______________________________________________________________________________



Interpretation Summary

LV EF is 45%

Left ventricular systolic function is mildly reduced.

LV diastolic function could not be adequately assessed.

There is mild concentric left ventricular hypertrophy.

The left ventricle is grossly normal size.

There is apical wall hypokinesis

Right ventricular function cannot be assessed due to poor image quality.

Right atrium not well visualized secondary to technical limitations

The left atrium is mildly dilated.

There is a moderate amount of mitral regurgitation

There is no mitral valve stenosis.

Aortic valve apeears stenotic. Mild to moderate.

No aortic regurgitation is present.

There is a mild to moderate amount of tricuspid regurgitation

There is mild to moderate pulmonary hypertension by echo

Right ventricular systolic pressure is estimated to be elevated at 40-50mmHg.

The aortic root is not well visualized.

The inferior vena cava was not well visualized

There is no pericardial effusion.



MMode/2D Measurements & Calculations

RVDd: 3.6 cm       LVIDd: 5.6 cm  FS: 34.9 %            Ao root diam: 3.4 cm

IVSd: 0.99 cm      LVIDs: 3.7 cm  EDV(Teich): 155.6 ml

                                                        Ao root area: 9.1 cm2

                   LVPWd: 0.98 cm ESV(Teich): 56.8 ml

                                  EF(Teich): 63.5 %

        _______________________________________________________________

LVOT diam: 1.9 cm

LVOT area: 2.9 cm2





Doppler Measurements & Calculations

MV E max harris:      MV P1/2t max harris:     Ao V2 max:          LV V1 max P.2 cm/sec        69.9 cm/sec           192.0 cm/sec        4.0 mmHg

MV A max harris:      MV P1/2t: 67.3 msec   Ao max PG:          LV V1 max:

79.5 cm/sec        MVA(P1/2t): 3.3 cm2   14.7 mmHg           99.7 cm/sec

MV E/A: 0.75       MV dec slope:         MIGUEL(V,D): 1.5 cm2



                   304.2 cm/sec2

                   MV dec time: 0.19 sec

        _______________________________________________________________

PA V2 max:         TR max harris:           MV P1/2t-pr_phl:

118.7 cm/sec       282.3 cm/sec          67.3 msec

PA max P.6 mmHgTR max P.9 mmHg



Left Ventricle

The left ventricle is grossly normal size. There is mild concentric left

ventricular hypertrophy. Left ventricular systolic function is mildly reduced.

LV EF is 45%. LV diastolic function could not be adequately assessed. There is

apical wall hypokinesis.





Right Ventricle

The right ventricle is not well visualized secondary to technical limitations.

Right ventricular function cannot be assessed due to poor image quality.



Atria

Right atrium not well visualized secondary to technical limitations. The left

atrium is mildly dilated. Interarterial septum not well visualized and not

well dopplered. Cannot comment on ASD/PFO presence.



Mitral Valve

The mitral valve is grossly normal. There is no mitral valve stenosis. There

is a moderate amount of mitral regurgitation.



Aortic Valve

The aortic valve is mildly calcified. Aortic valve apeears stenotic. Mild to

moderate. No aortic regurgitation is present.





Tricuspid Valve

The tricuspid valve is not well visualized, but is grossly normal. There is no

tricuspid stenosis. There is a mild to moderate amount of tricuspid

regurgitation. There is mild to moderate pulmonary hypertension by echo. Right

ventricular systolic pressure is estimated to be elevated at 40-50mmHg.



Pulmonic Valve

The pulmonic valve is not well visualized.



Great Vessels

The aortic root is not well visualized. The inferior vena cava was not well

visualized.



Effusions

There is no pericardial effusion.





_______________________________________________________________________________

_______________________________________________________________________________



Electronically signed by:      Onur Lockwood      on 2018 01:23 PM



CC: CARLOS STEPHENS

>

Onur Lockwood
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
## 2018-09-12 VITALS — DIASTOLIC BLOOD PRESSURE: 93 MMHG | SYSTOLIC BLOOD PRESSURE: 184 MMHG

## 2019-03-19 ENCOUNTER — HOSPITAL ENCOUNTER (OUTPATIENT)
Dept: HOSPITAL 62 - RAD | Age: 69
End: 2019-03-19
Attending: INTERNAL MEDICINE
Payer: MEDICARE

## 2019-03-19 DIAGNOSIS — R11.2: Primary | ICD-10-CM

## 2019-03-19 PROCEDURE — A9541 TC99M SULFUR COLLOID: HCPCS

## 2019-03-19 PROCEDURE — 78264 GASTRIC EMPTYING IMG STUDY: CPT

## 2019-03-19 NOTE — RADIOLOGY REPORT (SQ)
EXAM DESCRIPTION:  NM GASTRIC EMPTYING STUDY



COMPLETED DATE/TIME:  3/19/2019 12:54 pm



REASON FOR STUDY:  NAUSEA WITH VOMITING R11.2  NAUSEA WITH VOMITING, UNSPECIFIED



COMPARISON:  None.



RADIONUCLIDE AND DOSE:  2 millicuries Tc-99m Sulfur Colloid.

Egg salad sandwich with water

The route of agent administration: Oral.



TECHNIQUE:  1 minute serial static imaging performed at time of meal, 1 hour, 2 hours, 3 hours, and 4
 hours as needed.  Once stomach reaches 90% emptying, the test is complete. Image intensity values pl
otted with respect to time with linear regression algorithm.



LIMITATIONS:  None.



FINDINGS:  Patient was observed for 1.5 hours.

Immediate post meal serves as baseline.

Gastric emptying at 30 minutes was 81.8%.

Gastric emptying at 60 minutes was 96.7%

Gastric emptying at 90 minutes was 99.4%.

Normal values:

60 minutes:  30-90% retained.  If less than 30%, abnormally rapid emptying.  If greater than 90%, del
ayed gastric emptying.

120 minutes:  <60% retained.  If greater than 60%, delayed gastric emptying.

240 minutes:  <10% retained.  If greater than 10%, delayed gastric emptying.



IMPRESSION:  Abnormally rapid gastric emptying.



TECHNICAL DOCUMENTATION:  JOB ID:  [id number]

 2011 [employer]- All Rights Reserved                           rev-5/18



Reading location - IP/workstation name: [email or web address]

## 2019-06-10 ENCOUNTER — HOSPITAL ENCOUNTER (OUTPATIENT)
Dept: HOSPITAL 62 - WC | Age: 69
End: 2019-06-10
Attending: PODIATRIST
Payer: MEDICARE

## 2019-06-10 DIAGNOSIS — L97.522: ICD-10-CM

## 2019-06-10 DIAGNOSIS — E11.621: Primary | ICD-10-CM

## 2019-06-10 LAB
ADD MANUAL DIFF: NO
ALBUMIN SERPL-MCNC: 3.7 G/DL (ref 3.5–5)
ALP SERPL-CCNC: 179 U/L (ref 38–126)
ALT SERPL-CCNC: 34 U/L (ref 21–72)
ANION GAP SERPL CALC-SCNC: 17 MMOL/L (ref 5–19)
AST SERPL-CCNC: 44 U/L (ref 17–59)
BASOPHILS # BLD AUTO: 0.1 10^3/UL (ref 0–0.2)
BASOPHILS NFR BLD AUTO: 1 % (ref 0–2)
BILIRUB DIRECT SERPL-MCNC: 0.6 MG/DL (ref 0–0.4)
BILIRUB SERPL-MCNC: 1.2 MG/DL (ref 0.2–1.3)
BUN SERPL-MCNC: 39 MG/DL (ref 7–20)
CALCIUM: 8.6 MG/DL (ref 8.4–10.2)
CHLORIDE SERPL-SCNC: 94 MMOL/L (ref 98–107)
CO2 SERPL-SCNC: 26 MMOL/L (ref 22–30)
CRP SERPL-MCNC: 13.8 MG/L (ref ?–10)
EOSINOPHIL # BLD AUTO: 0 10^3/UL (ref 0–0.6)
EOSINOPHIL NFR BLD AUTO: 0.8 % (ref 0–6)
ERYTHROCYTE [DISTWIDTH] IN BLOOD BY AUTOMATED COUNT: 18.7 % (ref 11.5–14)
ERYTHROCYTE [SEDIMENTATION RATE] IN BLOOD: 8 MM/HR (ref 0–20)
GLUCOSE SERPL-MCNC: 345 MG/DL (ref 75–110)
HCT VFR BLD CALC: 41.4 % (ref 37.9–51)
HGB BLD-MCNC: 13.4 G/DL (ref 13.5–17)
LYMPHOCYTES # BLD AUTO: 0.6 10^3/UL (ref 0.5–4.7)
LYMPHOCYTES NFR BLD AUTO: 10.2 % (ref 13–45)
MCH RBC QN AUTO: 31.8 PG (ref 27–33.4)
MCHC RBC AUTO-ENTMCNC: 32.3 G/DL (ref 32–36)
MCV RBC AUTO: 99 FL (ref 80–97)
MONOCYTES # BLD AUTO: 0.5 10^3/UL (ref 0.1–1.4)
MONOCYTES NFR BLD AUTO: 8.4 % (ref 3–13)
NEUTROPHILS # BLD AUTO: 5 10^3/UL (ref 1.7–8.2)
NEUTS SEG NFR BLD AUTO: 79.6 % (ref 42–78)
PLATELET # BLD: 143 10^3/UL (ref 150–450)
POTASSIUM SERPL-SCNC: 3.8 MMOL/L (ref 3.6–5)
PROT SERPL-MCNC: 6.1 G/DL (ref 6.3–8.2)
RBC # BLD AUTO: 4.21 10^6/UL (ref 4.35–5.55)
SODIUM SERPL-SCNC: 137.3 MMOL/L (ref 137–145)
TOTAL CELLS COUNTED % (AUTO): 100 %
WBC # BLD AUTO: 6.3 10^3/UL (ref 4–10.5)

## 2019-06-10 PROCEDURE — 36415 COLL VENOUS BLD VENIPUNCTURE: CPT

## 2019-06-10 PROCEDURE — 83036 HEMOGLOBIN GLYCOSYLATED A1C: CPT

## 2019-06-10 PROCEDURE — 86140 C-REACTIVE PROTEIN: CPT

## 2019-06-10 PROCEDURE — 80053 COMPREHEN METABOLIC PANEL: CPT

## 2019-06-10 PROCEDURE — 85652 RBC SED RATE AUTOMATED: CPT

## 2019-06-10 PROCEDURE — 85025 COMPLETE CBC W/AUTO DIFF WBC: CPT

## 2019-06-10 NOTE — RADIOLOGY REPORT (SQ)
EXAM DESCRIPTION:  FOOT LEFT COMPLETE



COMPLETED DATE/TIME:  6/10/2019 1:22 pm



REASON FOR STUDY:  NON-PRS CHRONIC ULCER OTH PRT LEFT FOOT W FAT LAYER EXPOSED L97.522  NON-PRS CHRON
IC ULCER OTH PRT LEFT FOOT W FAT LAYER  E11.621  TYPE 2 DIABETES MELLITUS WITH FOOT ULCER



COMPARISON:  None.



NUMBER OF VIEWS:  Three views.



TECHNIQUE:  AP, lateral and oblique  radiographic images acquired of the left foot.



LIMITATIONS:  None.



FINDINGS:  MINERALIZATION: Normal.

BONES: No acute fracture or dislocation.  Chronic appearing erosive changes in the head of the 1st me
tatarsal.  No worrisome bone lesions.

JOINTS: No effusions.

SOFT TISSUES: Lateral soft tissue swelling.  No visible gas in the soft tissues.  No foreign body.

OTHER: No other significant finding.



IMPRESSION:  LATERAL SOFT TISSUE SWELLING.  CHRONIC APPEARING CHANGES IN THE HEAD OF THE 1ST METATARS
AL.  NO ACUTE BONY FINDINGS.



TECHNICAL DOCUMENTATION:  JOB ID:  [id number]

 2011 [employer]- All Rights Reserved



Reading location - IP/workstation name: [email or web address]

## 2019-06-12 ENCOUNTER — HOSPITAL ENCOUNTER (OUTPATIENT)
Dept: HOSPITAL 62 - SP | Age: 69
End: 2019-06-12
Attending: PODIATRIST
Payer: MEDICARE

## 2019-06-12 DIAGNOSIS — L97.522: Primary | ICD-10-CM

## 2019-06-12 PROCEDURE — 93925 LOWER EXTREMITY STUDY: CPT

## 2019-06-13 NOTE — XCELERA REPORT
[address] [address]

                               Tel: [phone]

                               Fax: [phone]



                      Lower Extremity Arterial Evaluation

_______________________________________________________________________________



Name: ABELARDO CID

MRN: [id number]                                         Age: 68 yrs

Gender: Male                                            : 1950

Patient Status: Outpatient                              Patient Location: [facility]

Account #: [id number]

Study Date: 2019 03:16 PM

                             Accession #: [id number]

_______________________________________________________________________________

Procedure: A color flow and duplex scan of the lower extremity arteries was

performed bilaterally with velocity and waveform anaylsis.

Reason For Study: LT FOOT ULCER







Ordering Physician: ESTHER^BRAD^^^DPM

Performed By: Edd Smith

_______________________________________________________________________________

_______________________________________________________________________________





Measurements and Calculations



                                     Right  Left

                     CFA PSV         118.8 153.7 cm/sec

                     Prox PFA PSV    -49.9 -94.8 cm/sec

                     Prox SFA PSV    110.3 159.8 cm/sec

                     Mid SFA PSV     -67.3 -98.5 cm/sec

                     Dist SFA PSV    -76.6 -72.5 cm/sec

                     Prox Pop A PSV  72.5   81.6 cm/sec

                     Mid ERNESTINE PSV     47.4        cm/sec

                     Dist ERNESTINE PSV    -21.4 113.6 cm/sec

                     Prox PTA PSV           62.0 cm/sec

                     Mid PTA PSV            14.7 cm/sec

                     Dist PTA PSV    108.1 -32.0 cm/sec

                     Fly Pedis PSV   116.9 105.3 cm/sec



_______________________________________________________________________________

Right Side Arterial Evaluation

Normal velocity and triphasic waveforms noted from the Common Femoral artery

to the Posterior tibial. Biphasic with low normal velocity, reversal of flow

in the Anterior tibial. Normal Dorsalis Pedis artery.

Ankle Brachial index not obtained due to pitting edema.



Left Side Arterial Evaluation

Normal velocity and triphasic waveforms noted from the Common Femoral artery

to the Anterior tibial. Biphasic with low normal velocity, reversal of flow in

the Posterior tibial.

Ankle Brachial index not obtained due to pitting edema.



_______________________________________________________________________________

Interpretation Summary

Mild hemodynamically significant lesions in the bilateral lower extremities,

on duplex imaging, at rest. There is retrograde flow in the Anterior Tibial on

the right , Posterior Tibial on the left.

This suggests significant disease with collateral filling. the actual focus is

not identified.

With good collaterals, satisfactiory findings in other infrageniculate

vessels, clinica impact should be limited, if any.

_______________________________________________________________________________

Electronically signed by:      Lennox Williams      on 2019 03:17 PM



CC: ESTHER^BRAD^^^DPM

>

Williams, Lennox